# Patient Record
Sex: FEMALE | Race: BLACK OR AFRICAN AMERICAN | Employment: FULL TIME | URBAN - METROPOLITAN AREA
[De-identification: names, ages, dates, MRNs, and addresses within clinical notes are randomized per-mention and may not be internally consistent; named-entity substitution may affect disease eponyms.]

---

## 2016-11-14 LAB
HBSAG, EXTERNAL: NEGATIVE
HIV, EXTERNAL: NON REACTIVE
RUBELLA, EXTERNAL: NORMAL
TYPE, ABO & RH, EXTERNAL: NORMAL

## 2017-03-03 LAB
CHLAMYDIA, EXTERNAL: NEGATIVE
N. GONORRHEA, EXTERNAL: NEGATIVE

## 2017-03-27 LAB
ANTIBODY SCREEN, EXTERNAL: NEGATIVE
T. PALLIDUM, EXTERNAL: NEGATIVE

## 2017-05-09 LAB — GRBS, EXTERNAL: NEGATIVE

## 2017-06-19 ENCOUNTER — HOSPITAL ENCOUNTER (INPATIENT)
Age: 32
LOS: 4 days | Discharge: HOME | End: 2017-06-23
Attending: OBSTETRICS & GYNECOLOGY | Admitting: OBSTETRICS & GYNECOLOGY
Payer: COMMERCIAL

## 2017-06-19 ENCOUNTER — ANESTHESIA EVENT (OUTPATIENT)
Dept: LABOR AND DELIVERY | Age: 32
End: 2017-06-19
Payer: COMMERCIAL

## 2017-06-19 ENCOUNTER — ANESTHESIA (OUTPATIENT)
Dept: LABOR AND DELIVERY | Age: 32
End: 2017-06-19
Payer: COMMERCIAL

## 2017-06-19 PROBLEM — Z34.90 PREGNANCY: Status: ACTIVE | Noted: 2017-06-19

## 2017-06-19 LAB
BASOPHILS # BLD AUTO: 0 K/UL (ref 0–0.1)
BASOPHILS # BLD: 0 % (ref 0–1)
DAILY QC (YES/NO)?: YES
EOSINOPHIL # BLD: 0 K/UL (ref 0–0.4)
EOSINOPHIL NFR BLD: 1 % (ref 0–7)
ERYTHROCYTE [DISTWIDTH] IN BLOOD BY AUTOMATED COUNT: 13.3 % (ref 11.5–14.5)
HCT VFR BLD AUTO: 37.8 % (ref 35–47)
HGB BLD-MCNC: 13.2 G/DL (ref 11.5–16)
LYMPHOCYTES # BLD AUTO: 21 % (ref 12–49)
LYMPHOCYTES # BLD: 1.7 K/UL (ref 0.8–3.5)
MCH RBC QN AUTO: 31.7 PG (ref 26–34)
MCHC RBC AUTO-ENTMCNC: 34.9 G/DL (ref 30–36.5)
MCV RBC AUTO: 90.6 FL (ref 80–99)
MONOCYTES # BLD: 0.5 K/UL (ref 0–1)
MONOCYTES NFR BLD AUTO: 6 % (ref 5–13)
NEUTS SEG # BLD: 5.9 K/UL (ref 1.8–8)
NEUTS SEG NFR BLD AUTO: 72 % (ref 32–75)
PH, VAGINAL FLUID: 6.5 (ref 5–6.1)
PLATELET # BLD AUTO: 170 K/UL (ref 150–400)
RBC # BLD AUTO: 4.17 M/UL (ref 3.8–5.2)
WBC # BLD AUTO: 8.1 K/UL (ref 3.6–11)

## 2017-06-19 PROCEDURE — 74011250636 HC RX REV CODE- 250/636

## 2017-06-19 PROCEDURE — 76010000392 HC C SECN EA ADDL 0.5 HR: Performed by: OBSTETRICS & GYNECOLOGY

## 2017-06-19 PROCEDURE — 76060000078 HC EPIDURAL ANESTHESIA: Performed by: OBSTETRICS & GYNECOLOGY

## 2017-06-19 PROCEDURE — 99282 EMERGENCY DEPT VISIT SF MDM: CPT | Performed by: OBSTETRICS & GYNECOLOGY

## 2017-06-19 PROCEDURE — 74011250636 HC RX REV CODE- 250/636: Performed by: OBSTETRICS & GYNECOLOGY

## 2017-06-19 PROCEDURE — 85025 COMPLETE CBC W/AUTO DIFF WBC: CPT | Performed by: OBSTETRICS & GYNECOLOGY

## 2017-06-19 PROCEDURE — 76010000391 HC C SECN FIRST 1 HR: Performed by: OBSTETRICS & GYNECOLOGY

## 2017-06-19 PROCEDURE — 74011000250 HC RX REV CODE- 250

## 2017-06-19 PROCEDURE — 83986 ASSAY PH BODY FLUID NOS: CPT | Performed by: OBSTETRICS & GYNECOLOGY

## 2017-06-19 PROCEDURE — 65270000029 HC RM PRIVATE

## 2017-06-19 PROCEDURE — 59025 FETAL NON-STRESS TEST: CPT | Performed by: OBSTETRICS & GYNECOLOGY

## 2017-06-19 PROCEDURE — 75410000002 HC LABOR FEE PER 1 HR: Performed by: OBSTETRICS & GYNECOLOGY

## 2017-06-19 PROCEDURE — 77030007866 HC KT SPN ANES BBMI -B: Performed by: ANESTHESIOLOGY

## 2017-06-19 PROCEDURE — 75410000003 HC RECOV DEL/VAG/CSECN EA 0.5 HR: Performed by: OBSTETRICS & GYNECOLOGY

## 2017-06-19 PROCEDURE — 36415 COLL VENOUS BLD VENIPUNCTURE: CPT | Performed by: OBSTETRICS & GYNECOLOGY

## 2017-06-19 PROCEDURE — 75810000275 HC EMERGENCY DEPT VISIT NO LEVEL OF CARE

## 2017-06-19 RX ORDER — OXYTOCIN IN 5 % DEXTROSE 30/500 ML
1-25 PLASTIC BAG, INJECTION (ML) INTRAVENOUS
Status: DISCONTINUED | OUTPATIENT
Start: 2017-06-19 | End: 2017-06-20

## 2017-06-19 RX ORDER — SODIUM CHLORIDE, SODIUM LACTATE, POTASSIUM CHLORIDE, CALCIUM CHLORIDE 600; 310; 30; 20 MG/100ML; MG/100ML; MG/100ML; MG/100ML
125 INJECTION, SOLUTION INTRAVENOUS CONTINUOUS
Status: DISCONTINUED | OUTPATIENT
Start: 2017-06-19 | End: 2017-06-19

## 2017-06-19 RX ORDER — SODIUM CHLORIDE, SODIUM LACTATE, POTASSIUM CHLORIDE, CALCIUM CHLORIDE 600; 310; 30; 20 MG/100ML; MG/100ML; MG/100ML; MG/100ML
INJECTION, SOLUTION INTRAVENOUS
Status: DISCONTINUED | OUTPATIENT
Start: 2017-06-19 | End: 2017-06-20 | Stop reason: HOSPADM

## 2017-06-19 RX ORDER — NALOXONE HYDROCHLORIDE 0.4 MG/ML
0.4 INJECTION, SOLUTION INTRAMUSCULAR; INTRAVENOUS; SUBCUTANEOUS AS NEEDED
Status: DISCONTINUED | OUTPATIENT
Start: 2017-06-19 | End: 2017-06-20 | Stop reason: HOSPADM

## 2017-06-19 RX ORDER — BUPIVACAINE HYDROCHLORIDE 7.5 MG/ML
INJECTION, SOLUTION EPIDURAL; RETROBULBAR AS NEEDED
Status: DISCONTINUED | OUTPATIENT
Start: 2017-06-19 | End: 2017-06-20 | Stop reason: HOSPADM

## 2017-06-19 RX ORDER — KETOROLAC TROMETHAMINE 30 MG/ML
30 INJECTION, SOLUTION INTRAMUSCULAR; INTRAVENOUS
Status: CANCELLED | OUTPATIENT
Start: 2017-06-19 | End: 2017-06-20

## 2017-06-19 RX ORDER — SODIUM CHLORIDE, SODIUM LACTATE, POTASSIUM CHLORIDE, CALCIUM CHLORIDE 600; 310; 30; 20 MG/100ML; MG/100ML; MG/100ML; MG/100ML
125 INJECTION, SOLUTION INTRAVENOUS CONTINUOUS
Status: DISCONTINUED | OUTPATIENT
Start: 2017-06-19 | End: 2017-06-20

## 2017-06-19 RX ORDER — CEFAZOLIN SODIUM IN 0.9 % NACL 2 G/50 ML
2 INTRAVENOUS SOLUTION, PIGGYBACK (ML) INTRAVENOUS ONCE
Status: DISCONTINUED | OUTPATIENT
Start: 2017-06-19 | End: 2017-06-20

## 2017-06-19 RX ORDER — SODIUM CHLORIDE 0.9 % (FLUSH) 0.9 %
5-10 SYRINGE (ML) INJECTION EVERY 8 HOURS
Status: DISCONTINUED | OUTPATIENT
Start: 2017-06-19 | End: 2017-06-20

## 2017-06-19 RX ORDER — MORPHINE SULFATE 0.5 MG/ML
INJECTION, SOLUTION EPIDURAL; INTRATHECAL; INTRAVENOUS AS NEEDED
Status: DISCONTINUED | OUTPATIENT
Start: 2017-06-19 | End: 2017-06-20 | Stop reason: HOSPADM

## 2017-06-19 RX ORDER — DIPHENHYDRAMINE HYDROCHLORIDE 50 MG/ML
25 INJECTION, SOLUTION INTRAMUSCULAR; INTRAVENOUS
Status: CANCELLED | OUTPATIENT
Start: 2017-06-19 | End: 2017-06-20

## 2017-06-19 RX ORDER — SODIUM CHLORIDE 0.9 % (FLUSH) 0.9 %
5-10 SYRINGE (ML) INJECTION AS NEEDED
Status: DISCONTINUED | OUTPATIENT
Start: 2017-06-19 | End: 2017-06-20

## 2017-06-19 RX ADMIN — BUPIVACAINE HYDROCHLORIDE 1.6 ML: 7.5 INJECTION, SOLUTION EPIDURAL; RETROBULBAR at 23:38

## 2017-06-19 RX ADMIN — SODIUM CHLORIDE, SODIUM LACTATE, POTASSIUM CHLORIDE, AND CALCIUM CHLORIDE 125 ML/HR: 600; 310; 30; 20 INJECTION, SOLUTION INTRAVENOUS at 20:50

## 2017-06-19 RX ADMIN — SODIUM CHLORIDE, SODIUM LACTATE, POTASSIUM CHLORIDE, CALCIUM CHLORIDE: 600; 310; 30; 20 INJECTION, SOLUTION INTRAVENOUS at 23:38

## 2017-06-19 RX ADMIN — MORPHINE SULFATE 300 MCG: 0.5 INJECTION, SOLUTION EPIDURAL; INTRATHECAL; INTRAVENOUS at 23:38

## 2017-06-19 RX ADMIN — SODIUM CHLORIDE, SODIUM LACTATE, POTASSIUM CHLORIDE, AND CALCIUM CHLORIDE 500 ML: 600; 310; 30; 20 INJECTION, SOLUTION INTRAVENOUS at 03:41

## 2017-06-19 RX ADMIN — Medication 2 MILLI-UNITS/MIN: at 20:49

## 2017-06-19 NOTE — PROGRESS NOTES
Introduced myself to pt. Previously discussed plans of care with Dr. Boston Dakins, and offered to answer any questions. Currently standing for contractions,  supporting. FHR with moderate variability and some variable decels.     Will continue monitoring

## 2017-06-19 NOTE — ED TRIAGE NOTES
Labor and delivery staff aware of patient arrival; states they are expecting patient. Assisting to labor and delivery unit via wheelchair by RN.

## 2017-06-19 NOTE — PROGRESS NOTES
06/19/17 12:26 PM  CM met with patient to complete initial assessment and to begin discharge planning. Demographics were reviewed and confirmed. Patient works as a  and will have 12 weeks off from work. Patient's /FOB Javi Shah [phone]) works and will have 2 weeks off from work. This is the couple's first child. There is a large support system of family and friends to provide assistance as needed. Patient plans to breastfeed and has a pump to use. Pediatric Center ([facility] location) will provide medical follow up for the baby. Patient has car seat, crib, clothing, and other necessary supplies. Denied need for [address] [place] [address] and Medicaid services. Care Management Interventions  PCP Verified by CM:  Yes  Transition of Care Consult (CM Consult): Discharge Planning  Current Support Network: Lives with Spouse  Confirm Follow Up Transport: Family  Plan discussed with Pt/Family/Caregiver: Yes  Discharge Location  Discharge Placement: [address] [place] [address]

## 2017-06-19 NOTE — PROGRESS NOTES
Patient seen   Sitting up in bed, calm environment, breathing through contractions  Continued loss of scant clear fluid, no bleeding  Visit Vitals    /81    Pulse 72    Temp 98.5 °F (36.9 °C)    Resp 18    Ht 5' 4.5\" (1.638 m)    Wt 75.8 kg (167 lb)    Breastfeeding No    BMI 28.22 kg/m2      Patient Vitals for the past 4 hrs: Mode Fetal Heart Rate Fetal Activity Variability Decelerations Accelerations RN Reviewed Strip?  FHR Interventions   06/19/17 0630 External 135 - 6-25 BPM Variable Yes Yes -   06/19/17 0600 External 135 Present 6-25 BPM Early Yes Yes -   06/19/17 0530 External 135 Present 6-25 BPM None Yes Yes -   06/19/17 0500 External 135 Present 6-25 BPM Early No Yes -   06/19/17 0430 External 135 Present 6-25 BPM Variable Yes Yes -   06/19/17 0411 - - - - - - - Other (comment)   06/19/17 0400 External 145 Present 6-25 BPM Variable No Yes -   06/19/17 0343 - - - - - - - IV Fluid Bolus   06/19/17 0330 External 140 Present 6-25 BPM Variable No Yes -    Cervical Exam  Dilation (cm): 4 (Dr Hatch Cord)  Eff: 100 %  Station: -1  Membrane Status: SROM   discussed augmentation which patient will consider at some point today but would like to continue unassisted at this time  Reassuring fetal surveillance and no sign of infection or concerning clinical findings

## 2017-06-19 NOTE — PROGRESS NOTES
1900 Assumed care of pt at this time from [address] [place], [place] [address]MICHELrena Denisa, Millicent Kiser 124 Discussing use of pitocin with pt (after being presented as an option to pt and  by Dr. Daya Peck via phone call with the pt and MD). Questions answered. Pt and  will discuss further. MD to call back later.  Spoke to Dr. Daya Peck, verbal order received to start pitocin at this time. RN confirmed with MD that he is aware of occasional variable decels, MD states he is aware.  Spoke to Dr. Thanh Avelar, updated on pt's current status, pitocin, FHT, variables, POC. MD states she is aware and reviewed fetal tracing. David Fraserrt Dr. Thanh Avelar at bedside, SVE /-1. MD discussed lack of progress with pt, her assessment of pelvic exam, giving pt until midnight until reassessment. 18 Pt and  state they would like to proceed with  at this time rather than waiting until midnight. MD aware. 2340 FHR in 's after returning to baseline from decel when EFM initially applied.

## 2017-06-19 NOTE — PROGRESS NOTES
0700 Received bedside verbal report from Basim Andre RN. Patient is laboring with  at the bedside. Patient complains of pain during contractions 7/10. No pain relief requested at this time. Patient denies headaches, N/V, visual disturbances, SOB, chest pain vaginal discharge or bleeding. RN discussed birthing plan with patient and , all agree. Will continue to monitor. 9074 Dr Romana Standing at the bedside assessing patient. SVE 4/100/-1. No new orders at this time will continue to monitor. 1045 Discussed using Nitrous with patient as a pain control method. Patient elected to start using nitrous. Rn gathered machine and reviewed agreement and consent with patient and . Patient given the opportunity for questions. None at this time. Will continue to monitor. 1200 Patient wanted to ambulate in the halls. RN place patient on remote monitoring. Will continue to monitor. 200 Dr. Marielos Cr at the bedside assessing patient. SVE 6/100/-1. No new orders. Will continue to monitor. 1600 Patient complained of increased pressure and requested to be checked by RN, SVE 6/100/0. Will continue to monitor. 1655 SVE by Nayan Anaya. 6/100/0. No change from previous check. MD notified. No further orders at this time. MD explained that he will contact patient and discuss options. Bedside and Verbal shift change report given to Jerry Steven RN (oncoming nurse) by Sebastián South RN (offgoing nurse). Report included the following information SBAR, Kardex and MAR.

## 2017-06-19 NOTE — PROGRESS NOTES
0059:  at 41.1 wks. Arrives with c/o contractions every 5-6 min and leaking fluid since 0017. Pt. States fluid is clear without odor. Pt. Desires unmedicated, low-intervention labor and birth. Pt. Presents birth plan. RN reviewed birth plan with patient. 0117: Pt. Repositioned to right lateral.  0120: Pt. Advised that since she will not be receiving continuous IV fluids at this time she needs to be orally hydrating frequently during labor. Pt. Provided 1 liter of water to drink. Pt. Verbalized understanding. 0139: EFM removed so patient can ambulate in halls. Pt. Given underware and pads to wear while ambulating. Pt. Instructed to remain on unit while ambulating and to ambulate X 1 hour and return to room after for fetal monitoring. Pt. Verbalized understanding. 12: Discussed with pt. That if variable decels continue will need to start IV fluids. At this time will attempt to reposition side to side to improve variables. Pt. Verbalized understanding. Pt. Continues to PO hydrate with water. 0: Discussed with pt. Need to bolus with IV fluids due to continued variable decels and FHR tracing. Pt. Agrees to IV fluid bolus. 0430: Pt. States feeling slight pressure. Offered to recheck cervix but pt. Declines vaginal exam at this time. Pt. Will notify RN when she is ready to be checked. 0450: Pt. oob to bathroom  0502: Pt. Given cranberry juice to drink. : Pt. Standing at side of bed with  providing support.   Torres Christopher: SBAR report given to MICHEL Perez RN

## 2017-06-19 NOTE — H&P
History & Physical    Name: Timmy Saldana MRN: [id number]  SSN: [id number]    YOB: 1985  Age: 33 y.o. Sex: female        Subjective:     Estimated Date of Delivery: 17  OB History    Para Term  AB SAB TAB Ectopic Multiple Living   1         0      # Outcome Date GA Lbr Jai/2nd Weight Sex Delivery Anes PTL Lv   1 Current                   Ms. Anabelle Grant is admitted with pregnancy at 41w1d for labor check. Prenatal course was normal. Please see prenatal records for details. Past Medical History:   Diagnosis Date    Abnormal Papanicolaou smear of cervix     Other unknown and unspecified cause of morbidity or mortality     Uterine fibroids     No past surgical history on file. Social History     Occupational History    Not on file. Social History Main Topics    Smoking status: Never Smoker    Smokeless tobacco: Not on file    Alcohol use No    Drug use: No    Sexual activity: Yes     Partners: Male     No family history on file. No Known Allergies  Prior to Admission medications    Medication Sig Start Date End Date Taking? Authorizing Provider   prenatal vit-iron fumarate-fa (RIGHT STEP PRENATAL VITAMINS) 27 mg iron- 0.8 mg tab tablet Take 1 Tab by mouth daily. Yes Historical Provider        Review of Systems: Constitutional: negative  Respiratory: negative  Cardiovascular: negative  Gastrointestinal: negative  Genitourinary:negative  Integument/breast: negative  Musculoskeletal:negative  Neurological: negative  Behavioral/Psych: negative  Endocrine: negative    Objective:     Vitals:  Vitals:    17 0104   Weight: 75.8 kg (167 lb)   Height: 5' 4.5\" (1.638 m)        Physical Exam:  Patient without distress.   Heart: Regular rate and rhythm  Lung: clear to auscultation throughout lung fields and normal respiratory effort  Abdomen: soft, nontender  Fundus: soft and non tender  Perineum: blood absent, amniotic fluid present/100/-2  Cervical Exam: 4  Membranes: Intact  Fetal Heart Rate: reassuring tracing ctx q 3 minutes    Prenatal Labs:   No results found for: ABORH, RUBELLAEXT, GRBSEXT, HBSAGEXT, HIVEXT, RPREXT, GONNOEXT, CHLAMEXT, ABORHEXT, RUBELLAEXT, GRBSEXT, HBSAGEXT, HIVEXT, RPREXT, GONNOEXT, CHLAMEXT      Assessment/Plan:     Active Problems:    * No active hospital problems.  *       Plan:Admit for labor wants to go natural can have epidural of desires    Signed By:  Beka Adams MD     June 19, 2017

## 2017-06-19 NOTE — PROGRESS NOTES
Doing well, laboring naturally. Visit Vitals    /74    Pulse 78    Temp 98.7 °F (37.1 °C)    Resp 18    Ht 5' 4.5\" (1.638 m)    Wt 75.8 kg (167 lb)    SpO2 99%    Breastfeeding No    BMI 28.22 kg/m2      Patient Vitals for the past 4 hrs: Mode Fetal Heart Rate Fetal Activity Variability Decelerations Accelerations RN Reviewed Strip?   06/19/17 1128 External 135 Present 6-25 BPM Variable Yes Yes   06/19/17 1100 External 135 Present 6-25 BPM Variable Yes Yes   06/19/17 1030 External 135 Present 6-25 BPM Variable Yes Yes   06/19/17 1000 External 135 Present 6-25 BPM Variable Yes Yes   06/19/17 0930 External 135 Present 6-25 BPM Variable Yes Yes   06/19/17 0900 External 135 Present 6-25 BPM None Yes Yes      Some variable decels in setting of mod agusto and accels. Cx C/6/-2. Continue natural labor   Cat 2 tracing, without evidence of distress.

## 2017-06-20 PROCEDURE — 65270000029 HC RM PRIVATE

## 2017-06-20 PROCEDURE — 74011250636 HC RX REV CODE- 250/636: Performed by: ANESTHESIOLOGY

## 2017-06-20 PROCEDURE — 77010026065 HC OXYGEN MINIMUM MEDICAL AIR: Performed by: OBSTETRICS & GYNECOLOGY

## 2017-06-20 PROCEDURE — 75410000002 HC LABOR FEE PER 1 HR: Performed by: OBSTETRICS & GYNECOLOGY

## 2017-06-20 PROCEDURE — 74011250637 HC RX REV CODE- 250/637: Performed by: OBSTETRICS & GYNECOLOGY

## 2017-06-20 RX ORDER — SODIUM CHLORIDE 0.9 % (FLUSH) 0.9 %
5-10 SYRINGE (ML) INJECTION AS NEEDED
Status: DISCONTINUED | OUTPATIENT
Start: 2017-06-20 | End: 2017-06-23 | Stop reason: HOSPADM

## 2017-06-20 RX ORDER — SWAB
1 SWAB, NON-MEDICATED MISCELLANEOUS DAILY
Status: DISCONTINUED | OUTPATIENT
Start: 2017-06-20 | End: 2017-06-23 | Stop reason: HOSPADM

## 2017-06-20 RX ORDER — OXYTOCIN 10 [USP'U]/ML
INJECTION, SOLUTION INTRAMUSCULAR; INTRAVENOUS AS NEEDED
Status: DISCONTINUED | OUTPATIENT
Start: 2017-06-20 | End: 2017-06-20 | Stop reason: HOSPADM

## 2017-06-20 RX ORDER — NALOXONE HYDROCHLORIDE 0.4 MG/ML
0.4 INJECTION, SOLUTION INTRAMUSCULAR; INTRAVENOUS; SUBCUTANEOUS AS NEEDED
Status: DISCONTINUED | OUTPATIENT
Start: 2017-06-20 | End: 2017-06-23 | Stop reason: HOSPADM

## 2017-06-20 RX ORDER — ZOLPIDEM TARTRATE 5 MG/1
5 TABLET ORAL
Status: DISCONTINUED | OUTPATIENT
Start: 2017-06-20 | End: 2017-06-23 | Stop reason: HOSPADM

## 2017-06-20 RX ORDER — SODIUM CHLORIDE, SODIUM LACTATE, POTASSIUM CHLORIDE, CALCIUM CHLORIDE 600; 310; 30; 20 MG/100ML; MG/100ML; MG/100ML; MG/100ML
125 INJECTION, SOLUTION INTRAVENOUS CONTINUOUS
Status: DISCONTINUED | OUTPATIENT
Start: 2017-06-20 | End: 2017-06-23 | Stop reason: HOSPADM

## 2017-06-20 RX ORDER — OXYTOCIN/RINGER'S LACTATE 20/1000 ML
125-500 PLASTIC BAG, INJECTION (ML) INTRAVENOUS ONCE
Status: ACTIVE | OUTPATIENT
Start: 2017-06-20 | End: 2017-06-20

## 2017-06-20 RX ORDER — HYDROCODONE BITARTRATE AND ACETAMINOPHEN 5; 325 MG/1; MG/1
1 TABLET ORAL
Status: DISCONTINUED | OUTPATIENT
Start: 2017-06-20 | End: 2017-06-23 | Stop reason: HOSPADM

## 2017-06-20 RX ORDER — SIMETHICONE 80 MG
80 TABLET,CHEWABLE ORAL AS NEEDED
Status: DISCONTINUED | OUTPATIENT
Start: 2017-06-20 | End: 2017-06-23 | Stop reason: HOSPADM

## 2017-06-20 RX ORDER — KETOROLAC TROMETHAMINE 30 MG/ML
30 INJECTION, SOLUTION INTRAMUSCULAR; INTRAVENOUS
Status: DISPENSED | OUTPATIENT
Start: 2017-06-20 | End: 2017-06-21

## 2017-06-20 RX ORDER — SODIUM CHLORIDE 0.9 % (FLUSH) 0.9 %
5-10 SYRINGE (ML) INJECTION EVERY 8 HOURS
Status: DISCONTINUED | OUTPATIENT
Start: 2017-06-20 | End: 2017-06-21

## 2017-06-20 RX ORDER — IBUPROFEN 800 MG/1
800 TABLET ORAL EVERY 8 HOURS
Status: DISCONTINUED | OUTPATIENT
Start: 2017-06-20 | End: 2017-06-23 | Stop reason: HOSPADM

## 2017-06-20 RX ORDER — DIPHENHYDRAMINE HYDROCHLORIDE 50 MG/ML
25 INJECTION, SOLUTION INTRAMUSCULAR; INTRAVENOUS
Status: ACTIVE | OUTPATIENT
Start: 2017-06-20 | End: 2017-06-21

## 2017-06-20 RX ADMIN — KETOROLAC TROMETHAMINE 30 MG: 30 INJECTION, SOLUTION INTRAMUSCULAR at 02:42

## 2017-06-20 RX ADMIN — OXYTOCIN 10 UNITS: 10 INJECTION, SOLUTION INTRAMUSCULAR; INTRAVENOUS at 00:09

## 2017-06-20 RX ADMIN — SODIUM CHLORIDE, SODIUM LACTATE, POTASSIUM CHLORIDE, CALCIUM CHLORIDE: 600; 310; 30; 20 INJECTION, SOLUTION INTRAVENOUS at 00:08

## 2017-06-20 RX ADMIN — OXYTOCIN 30 UNITS: 10 INJECTION, SOLUTION INTRAMUSCULAR; INTRAVENOUS at 00:02

## 2017-06-20 RX ADMIN — IBUPROFEN 800 MG: 800 TABLET, FILM COATED ORAL at 21:58

## 2017-06-20 RX ADMIN — IBUPROFEN 800 MG: 800 TABLET, FILM COATED ORAL at 13:56

## 2017-06-20 NOTE — LACTATION NOTE
Problem: Lactation Care Plan  Goal: *Infant latching appropriately  Outcome: Progressing Towards Goal  Mom states baby nursed well several times after delivery. Nursed about 30 minutes ago, for 5 minutes.                   Goal: *Weight loss less than 10% of birth weight  Outcome: Progressing Towards Goal  Encouraged mom to attempt feeding every 2-3 hours in the first couple of days. Looking for 8-12 feedings in 24 hours. Don't limit baby at breast, allow baby to come of breast on it's own. Baby may want to feed more often then every 2-3 hours. Baby may not feed that often, but feedings should be attempted. If baby doesn't nurse, Mom can hand express drops of colostrum and drip into baby's mouth, or give to baby by finger feeding, cup feeding,or spoon feeding. Encouraged skin to skin.      Mom agrees with plan     Problem: Patient Education: Go to Patient Education Activity  Goal: Patient/Family Education  Outcome: Progressing Towards Goal  Reviewed breastfeeding basics: Supply and demand,  stomach size, early Feeding cues, skin to skin, positioning and baby led latch-on, assymetrical latch with signs of good, deep latch vs shallow, feeding frequency and duration, and log sheet for tracking infant feedings and output. Breastfeeding Booklet and Warm line information given.  Discussed typical  weight loss and the importance of infant weight checks with pediatrician 1-2 post discharge.

## 2017-06-20 NOTE — PROGRESS NOTES
Labor Progress Note  Patient seen, fetal heart rate and contraction pattern evaluated, patient examined. Physical Exam:  Cervical Exam: 6 cm dilated , 80%   Membranes:  ROM  Uterine Activity: Frequency: Every 2 minutes  Fetal Heart Rate: Category 2 fetal tracing  Station -1    Assessment/Plan:  Arrest of dilation despite adequate contractions; recommend primary  section. Technicalities,risks,benefits reviewed with patient. OB team notified; Anesthesia team notified.    Discussed with pt and family, agree

## 2017-06-20 NOTE — ANESTHESIA PREPROCEDURE EVALUATION
Anesthetic History   No history of anesthetic complications            Review of Systems / Medical History  Patient summary reviewed, nursing notes reviewed and pertinent labs reviewed    Pulmonary  Within defined limits                 Neuro/Psych   Within defined limits           Cardiovascular  Within defined limits                Exercise tolerance: >4 METS     GI/Hepatic/Renal  Within defined limits              Endo/Other  Within defined limits           Other Findings              Physical Exam    Airway  Mallampati: II  TM Distance: 4 - 6 cm  Neck ROM: normal range of motion   Mouth opening: Normal     Cardiovascular               Dental    Dentition: Lower dentition intact and Upper dentition intact     Pulmonary                 Abdominal         Other Findings            Anesthetic Plan    ASA: 2  Anesthesia type: spinal            Anesthetic plan and risks discussed with: Patient

## 2017-06-20 NOTE — OP NOTES
OPERATIVE REPORT         Name: Ravi Patel Record Number: [id number]    YOB: 1985   Today's Date: 2017      PREOPERATIVE DIAGNOSES   41.2weeks gestation, Arrest of dilation     POSTOPERATIVE DIAGNOSES   41.2weeks gestation, Arrest of dilation, Cephalo Pelvic Disproportion    PROCEDURE PERFORMED:   Primary low transverse  section via Pfannenstiel. SURGEON: Harriet Monterroso MD     ASSISTANT: QUANG     ANESTHESIA: Spinal     COMPLICATIONS: None. ESTIMATED BLOOD LOSS: 700 ml     INTRAVENOUS FLUIDS: 1300 ml     URINE OUTPUT: Approximately 100 mL clear urine at the end of the   procedure. INDICATIONS: Arrested @ 6 cm dilation, 80% effaced, -1 fetal station despite adequate contrations; Pelvis not adequate for delivery. FINDINGS:Live Female infant, 5 Ibs 14 ozs, 2675 gms, apgar 8/9; normal uterus, with small fibroids posteriorly x 2; normal tubes, normal ovaries. SPECIMENS REMOVED: None    DESCRIPTION OF PROCEDURE:   The patient was taken to the operating room, where spinal anesthesia was found to be adequate. She was then prepared and draped in the normal sterile fashion in the dorsal supine position, with a left-walled tilt. A Pfannenstiel skin incision was then made with a scalpel, and carried through to the underlying layer of fascia with the   Bovie. The fascia was incised in the midline, and the incision extended   laterally with the Casiano scissors. The superior aspect of the fascial   incision was then grasped with the Kocher clamps, elevated, and the   underlying rectus muscles dissected off bluntly. Attention was then turned   to the inferior aspect of this incision, which in a similar fashion was   grasped, tented up with the Kocher clamps, and the rectus muscles dissected   off bluntly. The rectus muscles were then  in the midline, and the   peritoneum identified, tented up, and entered sharply with the Metzenbaum   scissors.  The peritoneal incision was then extended superiorly and   inferiorly, with good visualization of the bladder. A bladder blade was   then inserted, and the vesicouterine peritoneum identified, grasped with   the pickups, and entered sharply with the Metzenbaum scissors. This   incision was then extended laterally, and the bladder flap was created   digitally. The bladder blade was then reinserted, and the lower uterine segment   incised in a transverse fashion with a scalpel. The uterine incision was   then extended laterally. The bladder blade was then removed, and the   infant's head delivered atraumatically. Meconium was noted upon entrance. The nose and mouth were suctioned, the delayed cord clamping for 1 min; then  cord was clamped and cut, and the   infant was handed off to the awaiting Pediatrician. Cord blood and blood gas not obtained. The placenta was then removed manually, the uterus was exteriorized and   cleared of all clots and debris. The uterine incision was repaired with 0   Monocryl in a running, locked fashion. A second layer of the same suture   was used to obtain excellent hemostasis. The bladder flap was then repaired   with 3-0 Vicryl in a running stitch, and the posterior cul-de-sac was then   suctioned for all fluid. The uterus was now returned intra-abdominally. The   gutters were cleared of all clots, and the peritoneum was closed with 3-0   Vicryl. The fascia was reapproximated with 0 Vicryl in a running fashion. The subcutaneous tissue layer was then irrigated and dried. The skin was   now closed in a subcuticular fashion using a 4-0 Monocryl needle. Steri-Strips were   applied. The patient tolerated the procedure well. Sponge, lap and needle counts   were correct x2. Ancef 2 g were given prior to making of the incision. The   patient was taken to the recovery room in stable condition.        Dominique Snowden MD

## 2017-06-20 NOTE — ANESTHESIA PROCEDURE NOTES
Spinal Block    Start time: 6/19/2017 11:41 PM  End time: 6/19/2017 11:41 PM  Performed by: Claudell Boozer  Authorized by: Claudell Boozer     Pre-procedure:   Indications: at surgeon's request and primary anesthetic  Preanesthetic Checklist: patient identified, risks and benefits discussed, anesthesia consent and timeout performed      Spinal Block:   Patient Position:  Seated  Prep Region:  Lumbar  Prep: chlorhexidine      Location:  L3-4  Technique:  Single shot        Needle:   Needle Type:  Pencan  Needle Gauge:  25 G  Attempts:  1      Events: CSF confirmed, no blood with aspiration and no paresthesia        Assessment:  Insertion:  Uncomplicated  Patient tolerance:  Patient tolerated the procedure well with no immediate complications

## 2017-06-20 NOTE — PROGRESS NOTES
Bedside shift change report given to VIRIDIANA Valenzuela RN (oncoming nurse) by FAISAL Hennessy RN (offgoing nurse). Report included the following information SBAR, Intake/Output and MAR.

## 2017-06-20 NOTE — ANESTHESIA POSTPROCEDURE EVALUATION
Post-Anesthesia Evaluation and Assessment    Patient: Angelique Christy MRN: [id number]  SSN: [id number]    YOB: 1985  Age: 33 y.o. Sex: female       Cardiovascular Function/Vital Signs  Visit Vitals    /63    Pulse 65    Temp 36.9 °C (98.5 °F)    Resp 18    Ht 5' 4.5\" (1.638 m)    Wt 75.8 kg (167 lb)    SpO2 98%    Breastfeeding No    BMI 28.22 kg/m2       Patient is status post No value filed. anesthesia for Procedure(s):   SECTION. Nausea/Vomiting: None    Postoperative hydration reviewed and adequate. Pain:  Pain Scale 1: Labor Algorithm/Pain Intensity (17 2151)  Pain Intensity 1: 8 (17 1905)   Managed    Neurological Status:   Neuro (WDL): Within Defined Limits (17 0715)   At baseline    Mental Status and Level of Consciousness: Arousable    Pulmonary Status:   O2 Device: Room air (17 0051)   Adequate oxygenation and airway patent    Complications related to anesthesia: None    Post-anesthesia assessment completed.  No concerns    Signed By: Sailaja Veliz MD     2017

## 2017-06-20 NOTE — PROGRESS NOTES
Bedside and Verbal shift change report given to [employer] (oncoming nurse) by Louise Gorman. Liudmila Calderon (offgoing nurse). Report given with SBAR, Kardex, Intake/Output and MAR.

## 2017-06-20 NOTE — PROGRESS NOTES
Post-Operative  Day 1    Audelia Conner     Assessment: Post-Op day 1, stable    Plan:   1. Routine post-operative care    Information for the patient's :  Adolfo Egan, Female [[id number]]   , Low Transverse   Patient doing well without significant complaint. Nausea and vomiting resolved, tolerating liquids, no flatus, christopher in place. Vitals:  Visit Vitals    /64 (BP 1 Location: Left arm, BP Patient Position: At rest)    Pulse (!) 55    Temp 98.3 °F (36.8 °C)    Resp 18    Ht 5' 4.5\" (1.638 m)    Wt 75.8 kg (167 lb)    SpO2 97%    Breastfeeding No    BMI 28.22 kg/m2     Temp (24hrs), Av.6 °F (37 °C), Min:98.3 °F (36.8 °C), Max:99 °F (37.2 °C)      Last 24hr Input/Output:    Intake/Output Summary (Last 24 hours) at 17 0742  Last data filed at 17 0326   Gross per 24 hour   Intake             2200 ml   Output             1000 ml   Net             1200 ml          Exam:        Patient without distress. Lungs clear. Abdomen, bowel sounds present, soft, expected tenderness, fundus firm Wound dressing intact     Perineum normal lochia noted               Lower extremities are negative for swelling, cords or tenderness. Labs:   Lab Results   Component Value Date/Time    WBC 8.1 2017 01:28 AM    HGB 13.2 2017 01:28 AM    HCT 37.8 2017 01:28 AM    PLATELET 481  01:28 AM       No results found for this or any previous visit (from the past 24 hour(s)).

## 2017-06-20 NOTE — ROUTINE PROCESS
0350 TRANSFER - OUT REPORT:    Verbal report given to Aliyah Rasmussen RN(name) on Giovani Willard  being transferred to MIU(unit) for routine progression of care       Report consisted of patients Situation, Background, Assessment and   Recommendations(SBAR). Information from the following report(s) SBAR, Kardex, Intake/Output, MAR and Recent Results was reviewed with the receiving nurse. Lines:   Peripheral IV 06/19/17 Right Forearm (Active)   Site Assessment Clean, dry, & intact 6/19/2017  7:10 PM   Phlebitis Assessment 0 6/19/2017  7:10 PM   Infiltration Assessment 0 6/19/2017  7:10 PM   Dressing Status Clean, dry, & intact 6/19/2017  7:10 PM   Dressing Type Tape;Transparent 6/19/2017  7:10 PM   Hub Color/Line Status Pink 6/19/2017  7:10 PM   Alcohol Cap Used Yes 6/19/2017  7:10 PM        Opportunity for questions and clarification was provided.       Patient transported with:   Registered Nurse

## 2017-06-20 NOTE — L&D DELIVERY NOTE
Delivery Summary    Patient: Angelique Christy MRN: [id number]  SSN: [id number]    YOB: 1985  Age: 33 y.o. Sex: female        Labor Events:    Labor: No    Rupture Date: 2017    Rupture Time: 12:17 AM    Rupture Type: SROM    Amniotic Fluid Volume:       Amniotic Fluid Description:  Amniotic fluid Odor: Clear    None     Induction: None         Induction Date:        Induction Time:       Indications for Induction:       Augmentation: Oxytocin    Augmentation Date:      Augmentation Time:      Indications for Augmentation: Ineffective Contraction Pattern    Events:        Cervical Ripening:       None    Rupture Identifier: Rupture 1     Labor complications: Additional complications:         Delivery Events:  Estimated Blood Loss (ml):        Information for the patient's Elizabeth Cosby, Female [[id number]]     Delivery Summary - Baby    Delivery Date: 2017  Delivery Time: 12:00 AM  Delivery Type: , Low Transverse    Section Delivery:     Sex:  female     Gestational Age: 38w3d  Delivery Clinician:  Giovany Franklin?: Yes  Delivery Location: OR           APGARS  One minute Five minutes Ten minutes   Skin color: 1   1        Heart rate: 2   2        Grimace: 2   2        Muscle tone: 1   2        Breathin   2        Totals: 8   9           Presentation: Vertex    Position:        Resuscitation Method:  Suctioning-deep; Suctioning-bulb; Tactile Stimulation     Meconium Stained: Other (Comment) terminal    Cord Vessels: 3 Vessels      Cord Events:    Cord Blood Sent?:  No    Blood Gases Sent?:  No    Placenta:  Date/Time:  12:02 AM  Removal: Manual Removal      Appearance: Normal      Measurements:  Birth Weight: 2.675 kg      Birth Length: 48.3 cm      Head Circumference: 34.5 cm      Chest Circumference: 31 cm     Abdominal Girth: 29.5 cm    Other Providers:           Group Beta Strep:   Lab Results   Component Value Date/Time Kade, External Negative 2017        Cord Blood Results:  Information for the patient's :  Jose Antonio Jamil, Female [[id number]]   No results found for: Loralie Mano, PCTDIG, BILI, ABORHEXT, ABORH    Information for the patient's newbornIsreal Myrna, Female [[id number]]   No results found for: APH, APCO2, APO2, AHCO3, ABEC, ABDC, O2ST, SITE, [place], PHI, PCO2I, PO2I, HCO3I, SO2I, IBD    Information for the patient's newbornIsreal Myrna, Female [[id number]]   No results found for: EPHV, PCO2V, PO2V, HCO3V, O2STV, EBDV

## 2017-06-21 LAB
BASOPHILS # BLD AUTO: 0 K/UL (ref 0–0.1)
BASOPHILS # BLD: 0 % (ref 0–1)
EOSINOPHIL # BLD: 0.1 K/UL (ref 0–0.4)
EOSINOPHIL NFR BLD: 1 % (ref 0–7)
ERYTHROCYTE [DISTWIDTH] IN BLOOD BY AUTOMATED COUNT: 13.8 % (ref 11.5–14.5)
HCT VFR BLD AUTO: 32.2 % (ref 35–47)
HGB BLD-MCNC: 11 G/DL (ref 11.5–16)
LYMPHOCYTES # BLD AUTO: 23 % (ref 12–49)
LYMPHOCYTES # BLD: 2 K/UL (ref 0.8–3.5)
MCH RBC QN AUTO: 31.5 PG (ref 26–34)
MCHC RBC AUTO-ENTMCNC: 34.2 G/DL (ref 30–36.5)
MCV RBC AUTO: 92.3 FL (ref 80–99)
MONOCYTES # BLD: 0.8 K/UL (ref 0–1)
MONOCYTES NFR BLD AUTO: 9 % (ref 5–13)
NEUTS SEG # BLD: 6 K/UL (ref 1.8–8)
NEUTS SEG NFR BLD AUTO: 67 % (ref 32–75)
PLATELET # BLD AUTO: 150 K/UL (ref 150–400)
RBC # BLD AUTO: 3.49 M/UL (ref 3.8–5.2)
WBC # BLD AUTO: 8.9 K/UL (ref 3.6–11)

## 2017-06-21 PROCEDURE — 74011250637 HC RX REV CODE- 250/637: Performed by: OBSTETRICS & GYNECOLOGY

## 2017-06-21 PROCEDURE — 65270000029 HC RM PRIVATE

## 2017-06-21 PROCEDURE — 36415 COLL VENOUS BLD VENIPUNCTURE: CPT | Performed by: OBSTETRICS & GYNECOLOGY

## 2017-06-21 PROCEDURE — 85025 COMPLETE CBC W/AUTO DIFF WBC: CPT | Performed by: OBSTETRICS & GYNECOLOGY

## 2017-06-21 RX ORDER — DOCUSATE SODIUM 100 MG/1
100 CAPSULE, LIQUID FILLED ORAL DAILY
Status: DISCONTINUED | OUTPATIENT
Start: 2017-06-22 | End: 2017-06-23 | Stop reason: HOSPADM

## 2017-06-21 RX ORDER — DOCUSATE SODIUM 100 MG/1
100 CAPSULE, LIQUID FILLED ORAL DAILY
Status: DISCONTINUED | OUTPATIENT
Start: 2017-06-22 | End: 2017-06-21

## 2017-06-21 RX ADMIN — IBUPROFEN 800 MG: 800 TABLET, FILM COATED ORAL at 06:03

## 2017-06-21 RX ADMIN — IBUPROFEN 800 MG: 800 TABLET, FILM COATED ORAL at 14:05

## 2017-06-21 RX ADMIN — IBUPROFEN 800 MG: 800 TABLET, FILM COATED ORAL at 21:19

## 2017-06-21 NOTE — LACTATION NOTE
Problem: Lactation Care Plan  Goal: *Infant latching appropriately  Outcome: Progressing Towards Goal  Mom comfortable and relaxed with breast feeding. No voided noted in last 30 hours, so peds order supplementation of up to 10 ccs. Assisted with feed with curved tip syringe with supplementation at breast.      Pt will successfully establish breastfeeding by feeding in response to early feeding cues   or wake every 3h, will obtain deep latch, and will keep log of feedings/output. Taught to BF at hunger cues and or q 2-3 hrs and to offer 10-20 drops of hand expressed colostrum at any non-feeds.        Breast Assessment  Left Breast: Large, Extra large  Left Nipple: Everted, Intact  Right Breast: Large, Extra large  Right Nipple: Everted, Intact  Breast- Feeding Assessment  Attends Breast-Feeding Classes: Yes  Breast-Feeding Experience: No  Breast Trauma/Surgery: No  Type/Quality: Good  Lactation Consultant Visits  Breast-Feedings: Good   Mother/Infant Observation  Mother Observation: Alignment, Breast comfortable, Close hold, Holds breast, Lets baby end feeding, Nipple round on release  Infant Observation: Audible swallows, Breast tissue moves, Latches nipple and aereolae, Opens mouth, Lips flanged, upper, Lips flanged, lower  LATCH Documentation  Latch: Grasps breast, tongue down, lips flanged, rhythmic sucking  Audible Swallowing: Spontaneous and intermittent (24 hours old)  Type of Nipple: Everted (after stimulation)  Comfort (Breast/Nipple): Soft/non-tender  Hold (Positioning): Full assist, teach one side, mother does other, staff holds  LATCH Score: 9          Goal: *Weight loss less than 10% of birth weight  Outcome: Progressing Towards Goal  Encouraged mom to attempt feeding every 2-3 hours in the first couple of days. Looking for 8-12 feedings in 24 hours. Don't limit baby at breast, allow baby to come of breast on it's own. Baby may want to feed more often then every 2-3 hours.   Baby may not feed that often, but feedings should be attempted. If baby doesn't nurse,  Mom can hand express drops of colostrum and drip into baby's mouth, or  give to baby by finger feeding, cup feeding,or spoon feeding. Encouraged skin to skin.      Mom agrees with plan     Problem: Patient Education: Go to Patient Education Activity  Goal: Patient/Family Education  Outcome: Progressing Towards Goal  Reviewed breastfeeding basics:  How milk is made and normal  breastfeeding behaviors discussed. Supply and demand,  stomach size, early feeding cues, skin to skin bonding with comfortable positioning and baby led latch-on reviewed.   How to identify signs of successful breastfeeding sessions reviewed; education on assymetrical latch, signs of effective latching vs shallow, in-effective latching, normal  feeding frequency and duration and expected infant output discussed.

## 2017-06-21 NOTE — PROGRESS NOTES
Bedside and Verbal shift change report given to Rei NAILS (oncoming nurse) by Shakeel Mccray (offgoing nurse). Report included the following information SBAR, Kardex and MAR.

## 2017-06-21 NOTE — PROGRESS NOTES
Bedside and Verbal shift change report given to Allison Delong 6896 (oncoming nurse) by Gale NAILS (offgoing nurse). Report included the following information SBAR, Kardex and MAR.

## 2017-06-21 NOTE — PROGRESS NOTES
Post-Operative  Day 2    Audelia Conner     Assessment: Post-Op day 2, doing well    Plan:   1. Routine post-operative care    Information for the patient's :  Karen Keene, Female [[id number]]   , Low Transverse   Patient doing well without significant complaint. Nausea and vomiting resolved, tolerating liquids, passing flatus, voiding and ambulating without difficulty. Vitals:  Visit Vitals    /70 (BP 1 Location: Right arm, BP Patient Position: At rest)    Pulse (!) 52    Temp 98.2 °F (36.8 °C)    Resp 16    Ht 5' 4.5\" (1.638 m)    Wt 75.8 kg (167 lb)    SpO2 97%    Breastfeeding No    BMI 28.22 kg/m2     Temp (24hrs), Av.3 °F (36.8 °C), Min:98.2 °F (36.8 °C), Max:98.4 °F (36.9 °C)        Exam:        Patient without distress. Abdomen, bowel sounds present, soft, expected tenderness, fundus firm                Wound incision clean, dry and intact               Lower extremities are negative for swelling, cords or tenderness. Labs:   Lab Results   Component Value Date/Time    WBC 8.9 2017 04:29 AM    WBC 8.1 2017 01:28 AM    HGB 11.0 2017 04:29 AM    HGB 13.2 2017 01:28 AM    HCT 32.2 2017 04:29 AM    HCT 37.8 2017 01:28 AM    PLATELET 145  04:29 AM    PLATELET 801  01:28 AM       Recent Results (from the past 24 hour(s))   CBC WITH AUTOMATED DIFF    Collection Time: 17  4:29 AM   Result Value Ref Range    WBC 8.9 3.6 - 11.0 K/uL    RBC 3.49 (L) 3.80 - 5.20 M/uL    HGB 11.0 (L) 11.5 - 16.0 g/dL    HCT 32.2 (L) 35.0 - 47.0 %    MCV 92.3 80.0 - 99.0 FL    MCH 31.5 26.0 - 34.0 PG    MCHC 34.2 30.0 - 36.5 g/dL    RDW 13.8 11.5 - 14.5 %    PLATELET 576 479 - 792 K/uL    NEUTROPHILS 67 32 - 75 %    LYMPHOCYTES 23 12 - 49 %    MONOCYTES 9 5 - 13 %    EOSINOPHILS 1 0 - 7 %    BASOPHILS 0 0 - 1 %    ABS. NEUTROPHILS 6.0 1.8 - 8.0 K/UL    ABS. LYMPHOCYTES 2.0 0.8 - 3.5 K/UL    ABS.  MONOCYTES 0.8 0.0 - 1.0 K/UL    ABS. EOSINOPHILS 0.1 0.0 - 0.4 K/UL    ABS.  BASOPHILS 0.0 0.0 - 0.1 K/UL

## 2017-06-22 PROCEDURE — 74011250637 HC RX REV CODE- 250/637: Performed by: OBSTETRICS & GYNECOLOGY

## 2017-06-22 PROCEDURE — 65270000029 HC RM PRIVATE

## 2017-06-22 RX ORDER — OXYCODONE AND ACETAMINOPHEN 5; 325 MG/1; MG/1
2 TABLET ORAL
Qty: 30 TAB | Refills: 0 | Status: SHIPPED | OUTPATIENT
Start: 2017-06-22

## 2017-06-22 RX ORDER — IBUPROFEN 800 MG/1
800 TABLET ORAL
Qty: 30 TAB | Refills: 1 | Status: SHIPPED | OUTPATIENT
Start: 2017-06-22

## 2017-06-22 RX ADMIN — IBUPROFEN 800 MG: 800 TABLET, FILM COATED ORAL at 21:04

## 2017-06-22 RX ADMIN — SIMETHICONE 80 MG: 80 TABLET, CHEWABLE ORAL at 21:04

## 2017-06-22 RX ADMIN — DOCUSATE SODIUM 100 MG: 100 CAPSULE ORAL at 01:32

## 2017-06-22 RX ADMIN — SIMETHICONE 80 MG: 80 TABLET, CHEWABLE ORAL at 01:32

## 2017-06-22 RX ADMIN — IBUPROFEN 800 MG: 800 TABLET, FILM COATED ORAL at 05:10

## 2017-06-22 RX ADMIN — IBUPROFEN 800 MG: 800 TABLET, FILM COATED ORAL at 12:53

## 2017-06-22 NOTE — PROGRESS NOTES
Post-Operative  Day 3    Audelia Conner       Assessment: Post-Op day 3, doing well    Plan:   1. Discharge home today  2. Follow up in office in 6 weeks with Ashley Kimball MD  3. Post partum activity/wound care advised, diet as tolerated  4. Discharge Medications: ibuprofen, percocet and medications prior to admission      Information for the patient's :  Dayana Iniguez, Female [[id number]]   , Low Transverse   Patient doing well without significant complaint. Tolerating diet, passing flatus, voiding and ambulating without difficulty    Vitals:  Visit Vitals    /76 (BP 1 Location: Right arm, BP Patient Position: At rest)    Pulse (!) 55    Temp 98.6 °F (37 °C)    Resp 16    Ht 5' 4.5\" (1.638 m)    Wt 75.8 kg (167 lb)    SpO2 97%    Breastfeeding No    BMI 28.22 kg/m2     Temp (24hrs), Av.2 °F (36.8 °C), Min:98 °F (36.7 °C), Max:98.6 °F (37 °C)        Exam:        Patient without distress. Abdomen, bowel sounds present, soft, expected tenderness, fundus firm                Wound incision clean, dry and intact               Lower extremities are negative for swelling, cords or tenderness. Labs:   Lab Results   Component Value Date/Time    WBC 8.9 2017 04:29 AM    WBC 8.1 2017 01:28 AM    HGB 11.0 2017 04:29 AM    HGB 13.2 2017 01:28 AM    HCT 32.2 2017 04:29 AM    HCT 37.8 2017 01:28 AM    PLATELET 264 8941 04:29 AM    PLATELET 135  01:28 AM       No results found for this or any previous visit (from the past 24 hour(s)).

## 2017-06-22 NOTE — PROGRESS NOTES
Bedside shift change report given to FAISAL Teague RN (oncoming nurse) by Wilson Mccartney RN (offgoing nurse). Report included the following information SBAR, Kardex, Intake/Output and MAR.

## 2017-06-22 NOTE — DISCHARGE SUMMARY
Obstetrical Discharge Summary     Name: Giovani Willard MRN: [id number]  SSN: [id number]    YOB: 1985  Age: 33 y.o. Sex: female      Admit Date: 2017    Discharge Date: 2017     Admitting Physician: Raleigh Casas MD     Attending Physician:  Evelina Lama MD     Admission Diagnoses: pregnancy  Pregnancy    Discharge Diagnoses:   Information for the patient's :  Edward Rodriguez, Female [[id number]]   Delivery of a 2.675 kg female infant via , Low Transverse on 2017 at 12:00 AM  by . Apgars were 8 and 9. Additional Diagnoses:   Hospital Problems  Never Reviewed          Codes Class Noted POA    Pregnancy ICD-10-CM: Z33.1  ICD-9-CM: V22.2  2017 Unknown             Lab Results   Component Value Date/Time    Rubella, External Immune 2016    GrBStrep, External Negative 2017       Hospital Course: Normal hospital course following the delivery. Disposition at Discharge: Home or self care    Discharged Condition: Stable    Patient Instructions:   Current Discharge Medication List      START taking these medications    Details   oxyCODONE-acetaminophen (PERCOCET) 5-325 mg per tablet Take 2 Tabs by mouth every six (6) hours as needed for Pain. Max Daily Amount: 8 Tabs. Qty: 30 Tab, Refills: 0      ibuprofen (MOTRIN) 800 mg tablet Take 1 Tab by mouth every eight (8) hours as needed for Pain. Qty: 30 Tab, Refills: 1         CONTINUE these medications which have NOT CHANGED    Details   prenatal vit-iron fumarate-fa (RIGHT STEP PRENATAL VITAMINS) 27 mg iron- 0.8 mg tab tablet Take 1 Tab by mouth daily. Reference my discharge instructions.     Follow-up Appointments   Procedures    FOLLOW UP VISIT Appointment in: 6 Weeks     Standing Status:   Standing     Number of Occurrences:   1     Order Specific Question:   Appointment in     Answer:   6 Weeks        Signed By:  Maame Echeverria MD     2017

## 2017-06-22 NOTE — LACTATION NOTE
Problem: Lactation Care Plan  Goal: *Infant latching appropriately  Outcome: Progressing Towards Goal  Mom comfortable and relaxed with breast feeding. Pt will successfully establish breastfeeding by feeding in response to early feeding cues   or wake every 3h, will obtain deep latch, and will keep log of feedings/output. Taught to BF at hunger cues and or q 2-3 hrs and to offer 10-20 drops of hand expressed colostrum at any non-feeds.        Breast Assessment  Left Breast: Large, Extra large  Left Nipple: Everted, Intact  Right Breast: Large, Extra large  Right Nipple: Everted, Intact  Breast- Feeding Assessment  Attends Breast-Feeding Classes: Yes  Breast-Feeding Experience: No  Breast Trauma/Surgery: No  Type/Quality: Good  Lactation Consultant Visits  Breast-Feedings: Good   Mother/Infant Observation  Mother Observation: Alignment, Breast comfortable, Close hold, Holds breast, Lets baby end feeding, Cramps  Infant Observation: Audible swallows, Breast tissue moves, Latches nipple and aereolae, Opens mouth, Lips flanged, upper, Lips flanged, lower  LATCH Documentation  Latch: Grasps breast, tongue down, lips flanged, rhythmic sucking  Audible Swallowing: Spontaneous and intermittent (24 hours old)  Type of Nipple: Everted (after stimulation)  Comfort (Breast/Nipple): Soft/non-tender  Hold (Positioning): No assist from staff, mother able to position/hold infant  LATCH Score: 10          Goal: *Weight loss less than 10% of birth weight  Outcome: Progressing Towards Goal  Encouraged mom to attempt feeding every 2-3 hours in the first couple of days. Looking for 8-12 feedings in 24 hours. Don't limit baby at breast, allow baby to come of breast on it's own. Baby may want to feed more often then every 2-3 hours. Baby may not feed that often, but feedings should be attempted.   If baby doesn't nurse,  Mom can hand express drops of colostrum and drip into baby's mouth, or  give to baby by finger feeding, cup feeding,or spoon feeding.  Encouraged skin to skin.      Mom agrees with plan     Problem: Patient Education: Go to Patient Education Activity  Goal: Patient/Family Education  Outcome: Progressing Towards Goal  Discussed ways to know if baby getting enough, ie, 4-6 wt diapers by day 7, return to birth weight by 2 weeks and a steady increase after that.

## 2017-06-23 VITALS
RESPIRATION RATE: 16 BRPM | DIASTOLIC BLOOD PRESSURE: 82 MMHG | WEIGHT: 167 LBS | BODY MASS INDEX: 27.82 KG/M2 | HEIGHT: 65 IN | TEMPERATURE: 98.9 F | HEART RATE: 51 BPM | OXYGEN SATURATION: 97 % | SYSTOLIC BLOOD PRESSURE: 130 MMHG

## 2017-06-23 PROCEDURE — 74011250637 HC RX REV CODE- 250/637: Performed by: OBSTETRICS & GYNECOLOGY

## 2017-06-23 RX ADMIN — IBUPROFEN 800 MG: 800 TABLET, FILM COATED ORAL at 07:03

## 2017-06-23 NOTE — LACTATION NOTE
This note was copied from a baby's chart. Mom states\" I just finished feeding the baby. \"  Instructed Mom to call the [facility] if she feeds again or before she leaves so I can do d/d teaching

## 2017-06-23 NOTE — LACTATION NOTE
This note was copied from a baby's chart. Pt will successfully establish breastfeeding by feeding in response to early feeding cues   or wake every 3h, will obtain deep latch, and will keep log of feedings/output. Taught to BF at hunger cues and or q 2-3 hrs and to offer 10-20 drops of hand expressed colostrum at any non-feeds. Breast Assessment  Left Breast: Large, Extra large  Left Nipple: Everted, Intact  Right Breast: Large, Extra large  Right Nipple: Everted, Intact  Breast- Feeding Assessment  Attends Breast-Feeding Classes: Yes  Breast-Feeding Experience: No  Breast Trauma/Surgery: No  Type/Quality: Good  Lactation Consultant Visits  Breast-Feedings: Good   Mother/Infant Observation  Mother Observation: Alignment, Holds breast, Lets baby end feeding, Nipple round on release, Breast comfortable, Recognizes feeding cues, Sleepy after feeding  Infant Observation: Latches nipple and aereolae, Rhythmic suck, Relaxed after feeding, Opens mouth, Lips flanged, upper, Lips flanged, lower, Audible swallows  LATCH Documentation  Latch: Grasps breast, tongue down, lips flanged, rhythmic sucking  Audible Swallowing: Spontaneous and intermittent (24 hours old)  Type of Nipple: Everted (after stimulation)  Comfort (Breast/Nipple): Soft/non-tender  Hold (Positioning): No assist from staff, mother able to position/hold infant  LATCH Score: 10  Chart shows numerous feedings, void, stool WNL. Discussed importance of monitoring outputs and feedings on first week of life. Discussed ways to tell if baby is  getting enough breast milk, ie  voids and stools, change in color of stool, and return to birth wt within 2 weeks. Follow up with pediatrician visit for weight check in 1-2 days (per AAP guidelines.)  Encouraged to call Warm [facility]  [phone] or [facility] at [phone] for any questions/problems that arise.  Mother also given breastfeeding support group dates and times for any future needsAnticipatory guidance given.  Questions answered. Discussed signs of baby's allergy, excema. Discussed engorgement management, when breast are soft and flat you are making more milk than when hard and engorged. If you should have to take a medication and MD says can't breast feed contact lactation office. Breast feed if you or the baby gets sick to pass along natural antibiotics. Confident breastfeeding Mom. Having good milk transfer. Discussed birth control and breastfeeding.

## 2017-06-23 NOTE — DISCHARGE INSTRUCTIONS
Section: What to Expect at [address] [place] [address]    A  section, or , is surgery to deliver your baby through a cut, called an incision, that the doctor makes in your lower belly and uterus. You may have some pain in your lower belly and need pain medicine for 1 to 2 weeks. You can expect some vaginal bleeding for several weeks. You will probably need about 6 weeks to fully recover. It is important to take it easy while the incision is healing. Avoid heavy lifting, strenuous activities, or exercises that strain the belly muscles while you are recovering. Ask a family member or friend for help with housework, cooking, and shopping. This care sheet gives you a general idea about how long it will take for you to recover. But each person recovers at a different pace. Follow the steps below to get better as quickly as possible. How can you care for yourself at home? Activity  · Rest when you feel tired. Getting enough sleep will help you recover. · Try to walk each day. Start by walking a little more than you did the day before. Bit by bit, increase the amount you walk. Walking boosts blood flow and helps prevent pneumonia, constipation, and blood clots. · Avoid strenuous activities, such as bicycle riding, jogging, weightlifting, and aerobic exercise, for 6 weeks or until your doctor says it is okay. · Until your doctor says it is okay, do not lift anything heavier than your baby. · Do not do sit-ups or other exercises that strain the belly muscles for 6 weeks or until your doctor says it is okay. · Hold a pillow over your incision when you cough or take deep breaths. This will support your belly and decrease your pain. · You may shower as usual. Pat the incision dry when you are done. · You will have some vaginal bleeding. Wear sanitary pads. Do not douche or use tampons until your doctor says it is okay. · Ask your doctor when you can drive again.   · You will probably need to take at least 6 weeks off work. It depends on the type of work you do and how you feel. · Ask your doctor when it is okay for you to have sex. Diet  · You can eat your normal diet. If your stomach is upset, try bland, low-fat foods like plain rice, broiled chicken, toast, and yogurt. · Drink plenty of fluids (unless your doctor tells you not to). · You may notice that your bowel movements are not regular right after your surgery. This is common. Try to avoid constipation and straining with bowel movements. You may want to take a fiber supplement every day. If you have not had a bowel movement after a couple of days, ask your doctor about taking a mild laxative. · If you are breastfeeding, do not drink any alcohol. Medicines  · Your doctor will tell you if and when you can restart your medicines. He or she will also give you instructions about taking any new medicines. · If you take blood thinners, such as warfarin (Coumadin), clopidogrel (Plavix), or aspirin, be sure to talk to your doctor. He or she will tell you if and when to start taking those medicines again. Make sure that you understand exactly what your doctor wants you to do. · Take pain medicines exactly as directed. ¨ If the doctor gave you a prescription medicine for pain, take it as prescribed. ¨ If you are not taking a prescription pain medicine, ask your doctor if you can take an over-the-counter medicine. · If you think your pain medicine is making you sick to your stomach:  ¨ Take your medicine after meals (unless your doctor has told you not to). ¨ Ask your doctor for a different pain medicine. · If your doctor prescribed antibiotics, take them as directed. Do not stop taking them just because you feel better. You need to take the full course of antibiotics. Incision care  · If you have strips of tape on the incision, leave the tape on for a week or until it falls off. · Wash the area daily with warm, soapy water, and pat it dry. Don't use hydrogen peroxide or alcohol, which can slow healing. You may cover the area with a gauze bandage if it weeps or rubs against clothing. Change the bandage every day. · Keep the area clean and dry. Other instructions  · If you breastfeed your baby, you may be more comfortable while you are healing if you place the baby so that he or she is not resting on your belly. Try tucking your baby under your arm, with his or her body along the side you will be feeding on. Support your baby's upper body with your arm. With that hand you can control your baby's head to bring his or her mouth to your breast. This is sometimes called the football hold. Follow-up care is a key part of your treatment and safety. Be sure to make and go to all appointments, and call your doctor if you are having problems. It's also a good idea to know your test results and keep a list of the medicines you take. When should you call for help? Call 911 anytime you think you may need emergency care. For example, call if:  · You passed out (lost consciousness). · You have symptoms of a blood clot in your lung (called a pulmonary embolism). These may include:  ¨ Sudden chest pain. ¨ Trouble breathing. ¨ Coughing up blood. · You have thoughts of harming yourself, your baby, or another person. Call your doctor now or seek immediate medical care if:  · You have severe vaginal bleeding. This means that you are soaking through a pad every hour for 2 or more hours. · You are dizzy or lightheaded, or you feel like you may faint. · You have new or more belly pain. · You have loose stitches, or your incision comes open. · You have symptoms of infection, such as:  ¨ Increased pain, swelling, warmth, or redness. ¨ Red streaks leading from the incision. ¨ Pus draining from the incision. ¨ A fever.   · You have symptoms of a blood clot in your leg (called a deep vein thrombosis), such as:  ¨ Pain in your calf, back of the knee, thigh, or groin.  ¨ Redness and swelling in your leg or groin. Watch closely for changes in your health, and be sure to contact your doctor if:  · You feel sad, anxious, or hopeless for more than a few days. · You do not get better as expected. Where can you learn more? Go to http://parish-fredrick.info/. Enter M806 in the search box to learn more about \" Section: What to Expect at Home. \"  Current as of: 2017  Content Version: 11.3  © 3145-3078 [employer]. Care instructions adapted under license by [employer] (which disclaims liability or warranty for this information). If you have questions about a medical condition or this instruction, always ask your healthcare professional. [address] any warranty or liability for your use of this information.

## 2017-06-23 NOTE — PROGRESS NOTES
Bedside shift change report given to FAISAL Randolph RN (oncoming nurse) by Ovi Potts RN (offgoing nurse). Report included the following information SBAR, Kardex, Intake/Output and MAR.

## 2017-06-23 NOTE — DISCHARGE SUMMARY
Obstetrical Discharge Summary     Name: Gabriela Snider MRN: [id number]  SSN: [id number]    YOB: 1985  Age: 33 y.o. Sex: female      Admit Date: 2017    Discharge Date: 2017     Admitting Physician: Michelle Skiff, MD     Attending Physician:  Elodia Zacarias MD     Admission Diagnoses: pregnancy  Pregnancy    Discharge Diagnoses:   Information for the patient's :  Pati Oleary, Female [[id number]]   Delivery of a 2.675 kg female infant via , Low Transverse on 2017 at 12:00 AM  by . Apgars were 8 and 9. Additional Diagnoses:   Hospital Problems  Never Reviewed          Codes Class Noted POA    Pregnancy ICD-10-CM: Z33.1  ICD-9-CM: V22.2  2017 Unknown             Lab Results   Component Value Date/Time    Rubella, External Immune 2016    GrBStrep, External Negative 2017       Hospital Course: Normal hospital course following the delivery. Disposition at Discharge: Home or self care    Discharged Condition: Stable    Patient Instructions:   Current Discharge Medication List      START taking these medications    Details   oxyCODONE-acetaminophen (PERCOCET) 5-325 mg per tablet Take 2 Tabs by mouth every six (6) hours as needed for Pain. Max Daily Amount: 8 Tabs. Qty: 30 Tab, Refills: 0      ibuprofen (MOTRIN) 800 mg tablet Take 1 Tab by mouth every eight (8) hours as needed for Pain. Qty: 30 Tab, Refills: 1         CONTINUE these medications which have NOT CHANGED    Details   prenatal vit-iron fumarate-fa (RIGHT STEP PRENATAL VITAMINS) 27 mg iron- 0.8 mg tab tablet Take 1 Tab by mouth daily. Reference my discharge instructions.     Follow-up Appointments   Procedures    FOLLOW UP VISIT Appointment in: Noemi Loving     Standing Status:   Standing     Number of Occurrences:   1     Order Specific Question:   Appointment in     Answer:   6 Weeks        Signed By:  Elodia Zacarias MD     2017

## 2021-09-08 LAB
ANTIBODY SCREEN, EXTERNAL: NEGATIVE
HBSAG, EXTERNAL: NEGATIVE
HIV, EXTERNAL: NEGATIVE
RPR, EXTERNAL: NONREACTIVE
RUBELLA, EXTERNAL: NORMAL
TYPE, ABO & RH, EXTERNAL: NORMAL

## 2022-03-02 LAB — GRBS, EXTERNAL: NEGATIVE

## 2022-03-14 ENCOUNTER — TRANSCRIBE ORDER (OUTPATIENT)
Dept: REGISTRATION | Age: 37
End: 2022-03-14

## 2022-03-14 ENCOUNTER — HOSPITAL ENCOUNTER (OUTPATIENT)
Dept: LAB | Age: 37
Discharge: HOME | End: 2022-03-14
Payer: COMMERCIAL

## 2022-03-14 DIAGNOSIS — Z01.812 PRE-PROCEDURAL LABORATORY EXAMINATIONS: ICD-10-CM

## 2022-03-14 DIAGNOSIS — Z01.812 PRE-PROCEDURAL LABORATORY EXAMINATIONS: Primary | ICD-10-CM

## 2022-03-14 LAB
SARS-COV-2, XPLCVT: NOT DETECTED
SOURCE, COVRS: NORMAL

## 2022-03-14 PROCEDURE — U0005 INFEC AGEN DETEC AMPLI PROBE: HCPCS

## 2022-03-18 PROBLEM — Z34.90 PREGNANCY: Status: ACTIVE | Noted: 2017-06-19

## 2022-03-31 ENCOUNTER — HOSPITAL ENCOUNTER (INPATIENT)
Age: 37
LOS: 4 days | Discharge: HOME | End: 2022-04-04
Attending: OBSTETRICS & GYNECOLOGY | Admitting: OBSTETRICS & GYNECOLOGY
Payer: COMMERCIAL

## 2022-03-31 DIAGNOSIS — G89.18 POSTOPERATIVE PAIN: Primary | ICD-10-CM

## 2022-03-31 LAB
ALBUMIN SERPL-MCNC: 2.9 G/DL (ref 3.5–5)
ALBUMIN/GLOB SERPL: 0.9 {RATIO} (ref 1.1–2.2)
ALP SERPL-CCNC: 133 U/L (ref 45–117)
ALT SERPL-CCNC: 26 U/L (ref 12–78)
ANION GAP SERPL CALC-SCNC: 6 MMOL/L (ref 5–15)
AST SERPL-CCNC: 22 U/L (ref 15–37)
BILIRUB SERPL-MCNC: 0.5 MG/DL (ref 0.2–1)
BUN SERPL-MCNC: 11 MG/DL (ref 6–20)
BUN/CREAT SERPL: 15 (ref 12–20)
CALCIUM SERPL-MCNC: 8.9 MG/DL (ref 8.5–10.1)
CHLORIDE SERPL-SCNC: 110 MMOL/L (ref 97–108)
CO2 SERPL-SCNC: 21 MMOL/L (ref 21–32)
CREAT SERPL-MCNC: 0.71 MG/DL (ref 0.55–1.02)
CREAT UR-MCNC: 295 MG/DL
ERYTHROCYTE [DISTWIDTH] IN BLOOD BY AUTOMATED COUNT: 14.3 % (ref 11.5–14.5)
GLOBULIN SER CALC-MCNC: 3.1 G/DL (ref 2–4)
GLUCOSE SERPL-MCNC: 101 MG/DL (ref 65–100)
HCT VFR BLD AUTO: 38.5 % (ref 35–47)
HGB BLD-MCNC: 13.1 G/DL (ref 11.5–16)
MCH RBC QN AUTO: 32.5 PG (ref 26–34)
MCHC RBC AUTO-ENTMCNC: 34 G/DL (ref 30–36.5)
MCV RBC AUTO: 95.5 FL (ref 80–99)
NRBC # BLD: 0 K/UL (ref 0–0.01)
NRBC BLD-RTO: 0 PER 100 WBC
PLATELET # BLD AUTO: 168 K/UL (ref 150–400)
PMV BLD AUTO: 10.5 FL (ref 8.9–12.9)
POTASSIUM SERPL-SCNC: 4 MMOL/L (ref 3.5–5.1)
PROT SERPL-MCNC: 6 G/DL (ref 6.4–8.2)
PROT UR-MCNC: 65 MG/DL (ref 0–11.9)
PROT/CREAT UR-RTO: 0.2
RBC # BLD AUTO: 4.03 M/UL (ref 3.8–5.2)
SODIUM SERPL-SCNC: 137 MMOL/L (ref 136–145)
WBC # BLD AUTO: 6.8 K/UL (ref 3.6–11)

## 2022-03-31 PROCEDURE — 65270000029 HC RM PRIVATE

## 2022-03-31 PROCEDURE — 4A1HXCZ MONITORING OF PRODUCTS OF CONCEPTION, CARDIAC RATE, EXTERNAL APPROACH: ICD-10-PCS | Performed by: OBSTETRICS & GYNECOLOGY

## 2022-03-31 PROCEDURE — 75410000002 HC LABOR FEE PER 1 HR: Performed by: OBSTETRICS & GYNECOLOGY

## 2022-03-31 PROCEDURE — 85027 COMPLETE CBC AUTOMATED: CPT

## 2022-03-31 PROCEDURE — 36415 COLL VENOUS BLD VENIPUNCTURE: CPT

## 2022-03-31 PROCEDURE — 3E033VJ INTRODUCTION OF OTHER HORMONE INTO PERIPHERAL VEIN, PERCUTANEOUS APPROACH: ICD-10-PCS | Performed by: OBSTETRICS & GYNECOLOGY

## 2022-03-31 PROCEDURE — 84156 ASSAY OF PROTEIN URINE: CPT

## 2022-03-31 PROCEDURE — 80053 COMPREHEN METABOLIC PANEL: CPT

## 2022-03-31 PROCEDURE — 74011250637 HC RX REV CODE- 250/637: Performed by: OBSTETRICS & GYNECOLOGY

## 2022-03-31 RX ORDER — LIDOCAINE HYDROCHLORIDE 10 MG/ML
10 INJECTION INFILTRATION; PERINEURAL ONCE
Status: ACTIVE | OUTPATIENT
Start: 2022-03-31 | End: 2022-04-01

## 2022-03-31 RX ORDER — SODIUM CHLORIDE 0.9 % (FLUSH) 0.9 %
5-40 SYRINGE (ML) INJECTION AS NEEDED
Status: DISCONTINUED | OUTPATIENT
Start: 2022-03-31 | End: 2022-04-04 | Stop reason: HOSPADM

## 2022-03-31 RX ORDER — ZOLPIDEM TARTRATE 5 MG/1
5 TABLET ORAL
Status: DISCONTINUED | OUTPATIENT
Start: 2022-03-31 | End: 2022-04-04 | Stop reason: HOSPADM

## 2022-03-31 RX ORDER — SODIUM CHLORIDE 0.9 % (FLUSH) 0.9 %
5-40 SYRINGE (ML) INJECTION EVERY 8 HOURS
Status: DISCONTINUED | OUTPATIENT
Start: 2022-03-31 | End: 2022-04-04 | Stop reason: HOSPADM

## 2022-03-31 RX ORDER — ONDANSETRON 2 MG/ML
4 INJECTION INTRAMUSCULAR; INTRAVENOUS
Status: DISCONTINUED | OUTPATIENT
Start: 2022-03-31 | End: 2022-04-01

## 2022-03-31 RX ORDER — OXYTOCIN/RINGER'S LACTATE 30/500 ML
87.3 PLASTIC BAG, INJECTION (ML) INTRAVENOUS AS NEEDED
Status: DISCONTINUED | OUTPATIENT
Start: 2022-03-31 | End: 2022-04-01

## 2022-03-31 RX ORDER — NALOXONE HYDROCHLORIDE 0.4 MG/ML
0.4 INJECTION, SOLUTION INTRAMUSCULAR; INTRAVENOUS; SUBCUTANEOUS AS NEEDED
Status: DISCONTINUED | OUTPATIENT
Start: 2022-03-31 | End: 2022-04-01 | Stop reason: HOSPADM

## 2022-03-31 RX ORDER — HYDROMORPHONE HYDROCHLORIDE 1 MG/ML
1 INJECTION, SOLUTION INTRAMUSCULAR; INTRAVENOUS; SUBCUTANEOUS
Status: DISCONTINUED | OUTPATIENT
Start: 2022-03-31 | End: 2022-04-01 | Stop reason: HOSPADM

## 2022-03-31 RX ORDER — OXYTOCIN/RINGER'S LACTATE 30/500 ML
0-20 PLASTIC BAG, INJECTION (ML) INTRAVENOUS
Status: DISCONTINUED | OUTPATIENT
Start: 2022-04-01 | End: 2022-04-04 | Stop reason: HOSPADM

## 2022-03-31 RX ORDER — OXYTOCIN/RINGER'S LACTATE 30/500 ML
10 PLASTIC BAG, INJECTION (ML) INTRAVENOUS AS NEEDED
Status: DISCONTINUED | OUTPATIENT
Start: 2022-03-31 | End: 2022-04-01

## 2022-03-31 RX ORDER — HYDROCODONE BITARTRATE AND ACETAMINOPHEN 5; 325 MG/1; MG/1
1 TABLET ORAL
Status: DISCONTINUED | OUTPATIENT
Start: 2022-03-31 | End: 2022-04-01

## 2022-03-31 RX ORDER — SODIUM CHLORIDE, SODIUM LACTATE, POTASSIUM CHLORIDE, CALCIUM CHLORIDE 600; 310; 30; 20 MG/100ML; MG/100ML; MG/100ML; MG/100ML
125 INJECTION, SOLUTION INTRAVENOUS CONTINUOUS
Status: DISCONTINUED | OUTPATIENT
Start: 2022-04-01 | End: 2022-04-04 | Stop reason: HOSPADM

## 2022-03-31 RX ADMIN — HYDROCODONE BITARTRATE AND ACETAMINOPHEN 1 TABLET: 5; 325 TABLET ORAL at 21:32

## 2022-03-31 NOTE — H&P
History & Physical    Name: Vega Trinh MRN: [id number]  SSN: [id number]    YOB: 1985  Age: 40 y.o. Sex: female        Subjective:     Estimated Date of Delivery: None noted. OB History        2    Para        Term                AB        Living   0       SAB        IAB        Ectopic        Molar        Multiple        Live Births                    Ms. Sadia Platt is admitted with pregnancy at 40wks 5 days for induction of labor. Prenatal course was complicated by prior c/s for stage 1 arrest and planning unmedicated . BPP  in office today. sent for IOL. pre-e eval pending. Please see prenatal records for details. Past Medical History:   Diagnosis Date    Abnormal Papanicolaou smear of cervix     Other unknown and unspecified cause of morbidity or mortality     Uterine fibroids     No past surgical history on file. Social History     Occupational History    Not on file   Tobacco Use    Smoking status: Never Smoker    Smokeless tobacco: Not on file   Substance and Sexual Activity    Alcohol use: No    Drug use: No    Sexual activity: Yes     Partners: Male     No family history on file. No Known Allergies  Prior to Admission medications    Medication Sig Start Date End Date Taking? Authorizing Provider   oxyCODONE-acetaminophen (PERCOCET) 5-325 mg per tablet Take 2 Tabs by mouth every six (6) hours as needed for Pain. Max Daily Amount: 8 Tabs. 17   Dung Fernandez MD   ibuprofen (MOTRIN) 800 mg tablet Take 1 Tab by mouth every eight (8) hours as needed for Pain. 17   Dung Fernandez MD   prenatal vit-iron fumarate-fa (RIGHT STEP PRENATAL VITAMINS) 27 mg iron- 0.8 mg tab tablet Take 1 Tab by mouth daily. Provider, Historical        Review of Systems: Pertinent items are noted in HPI. Objective:     Vitals: There were no vitals filed for this visit.      Physical Exam:  General NAD  CVS: RRR no r/mg  Pulmonary: CTAB  Abdm: gravid NT  Back: PRIYA CVA NT, spine NT  PRIYA LE NT w/o edema  [place]: irreg Q10  Membranes:  Intact  Fetal Heart Rate: Reactive  Baseline: 120 130 per minute  Variability: moderate  Accelerations: yes  Decelerations: none  SVE: 1/50/-2  EFW: 8 1/2  Cook placed w/o difficulty 60/60  gbs neg  Pre-e labs pending      Prenatal Labs:   Lab Results   Component Value Date/Time    Rubella, External Immune 11/14/2016 12:00 AM    GrBStrep, External Negative 05/09/2017 12:00 AM    HBsAg, External Negative 11/14/2016 12:00 AM    HIV, External Non Reactive 11/14/2016 12:00 AM    Gonorrhea, External Negative 03/03/2017 12:00 AM    Chlamydia, External Negative 03/03/2017 12:00 AM        Assessment/Plan:     Plan: Admit for IUP 40wks 5 days non reassuring fetal surveillance, elevated BP on arrival. TOLAC IOL. [facility], pit in am. labs pending. Plan/expectations/ logistics reviewed. all ques answered. .  Group B Strep was negative. Her CBC is pending. History of stage 1 arrest, planning TOLAC. PARQ held, risks/benefits reviewed, including but not limited to bleeding, infection, risk of rupture and potential maternal/fetal sequelae, risk of thrombosis, blood transfusion, surgical risks reviewed. All ques answered. Pt understands and agrees, consented.      Signed By:  Heather Davis MD     March 31, 2022

## 2022-03-31 NOTE — PROGRESS NOTES
1800 This RN orienting KATHRIN Rodriguez, RN    0251 Bedside and Verbal shift change report given to PATRICK Ellis RN (oncoming nurse) by Elaina Mackenzie RN (offgoing nurse). Report included the following information SBAR, MAR and Med Rec Status.

## 2022-03-31 NOTE — PROGRESS NOTES
26 Dr. Bert Frias at bedside discussing [address].      1809 Salguero bulb placed 60/60.     1811 VORB by Bert Frias MD pitocin 0600, pre-e labs, ambien 5mg prn once or norco 5-325 mg q4hr. MD ordered continuous monitoring and stated the pt can eat.

## 2022-04-01 ENCOUNTER — ANESTHESIA EVENT (OUTPATIENT)
Dept: LABOR AND DELIVERY | Age: 37
End: 2022-04-01
Payer: COMMERCIAL

## 2022-04-01 ENCOUNTER — ANESTHESIA (OUTPATIENT)
Dept: LABOR AND DELIVERY | Age: 37
End: 2022-04-01
Payer: COMMERCIAL

## 2022-04-01 PROCEDURE — 75410000000 HC DELIVERY VAGINAL/SINGLE: Performed by: OBSTETRICS & GYNECOLOGY

## 2022-04-01 PROCEDURE — 74011250636 HC RX REV CODE- 250/636: Performed by: OBSTETRICS & GYNECOLOGY

## 2022-04-01 PROCEDURE — 74011000250 HC RX REV CODE- 250: Performed by: ANESTHESIOLOGY

## 2022-04-01 PROCEDURE — 75410000003 HC RECOV DEL/VAG/CSECN EA 0.5 HR: Performed by: OBSTETRICS & GYNECOLOGY

## 2022-04-01 PROCEDURE — 76060000078 HC EPIDURAL ANESTHESIA: Performed by: ANESTHESIOLOGY

## 2022-04-01 PROCEDURE — 74011000250 HC RX REV CODE- 250

## 2022-04-01 PROCEDURE — 76060000078 HC EPIDURAL ANESTHESIA: Performed by: OBSTETRICS & GYNECOLOGY

## 2022-04-01 PROCEDURE — 65270000029 HC RM PRIVATE

## 2022-04-01 PROCEDURE — 76010000391 HC C SECN FIRST 1 HR: Performed by: OBSTETRICS & GYNECOLOGY

## 2022-04-01 PROCEDURE — 74011000250 HC RX REV CODE- 250: Performed by: OBSTETRICS & GYNECOLOGY

## 2022-04-01 PROCEDURE — 74011250636 HC RX REV CODE- 250/636: Performed by: NURSE ANESTHETIST, CERTIFIED REGISTERED

## 2022-04-01 PROCEDURE — 77030018809 HC RETRCTR ALXSO DISP AMR -B

## 2022-04-01 PROCEDURE — 65410000002 HC RM PRIVATE OB

## 2022-04-01 PROCEDURE — 74011250636 HC RX REV CODE- 250/636

## 2022-04-01 PROCEDURE — 74011000250 HC RX REV CODE- 250: Performed by: NURSE ANESTHETIST, CERTIFIED REGISTERED

## 2022-04-01 PROCEDURE — 77030040361 HC SLV COMPR DVT MDII -B

## 2022-04-01 PROCEDURE — 75410000002 HC LABOR FEE PER 1 HR: Performed by: OBSTETRICS & GYNECOLOGY

## 2022-04-01 PROCEDURE — 77030039147 HC PWDR HEMSTS SURGICEL JNJ -D

## 2022-04-01 PROCEDURE — 76010000392 HC C SECN EA ADDL 0.5 HR: Performed by: OBSTETRICS & GYNECOLOGY

## 2022-04-01 PROCEDURE — 77030005513 HC CATH URETH FOL11 MDII -B

## 2022-04-01 PROCEDURE — 2709999900 HC NON-CHARGEABLE SUPPLY

## 2022-04-01 PROCEDURE — 77030014125 HC TY EPDRL BBMI -B: Performed by: ANESTHESIOLOGY

## 2022-04-01 RX ORDER — SODIUM CHLORIDE 0.9 % (FLUSH) 0.9 %
5-40 SYRINGE (ML) INJECTION AS NEEDED
Status: DISCONTINUED | OUTPATIENT
Start: 2022-04-01 | End: 2022-04-04 | Stop reason: HOSPADM

## 2022-04-01 RX ORDER — SIMETHICONE 80 MG
80 TABLET,CHEWABLE ORAL AS NEEDED
Status: DISCONTINUED | OUTPATIENT
Start: 2022-04-01 | End: 2022-04-04 | Stop reason: HOSPADM

## 2022-04-01 RX ORDER — SODIUM CHLORIDE, SODIUM LACTATE, POTASSIUM CHLORIDE, CALCIUM CHLORIDE 600; 310; 30; 20 MG/100ML; MG/100ML; MG/100ML; MG/100ML
125 INJECTION, SOLUTION INTRAVENOUS CONTINUOUS
Status: DISCONTINUED | OUTPATIENT
Start: 2022-04-01 | End: 2022-04-04 | Stop reason: HOSPADM

## 2022-04-01 RX ORDER — CEFAZOLIN SODIUM 1 G/3ML
INJECTION, POWDER, FOR SOLUTION INTRAMUSCULAR; INTRAVENOUS
Status: DISPENSED
Start: 2022-04-01 | End: 2022-04-02

## 2022-04-01 RX ORDER — FENTANYL/BUPIVACAINE/NS/PF 2-1250MCG
1-16 PREFILLED PUMP RESERVOIR EPIDURAL CONTINUOUS
Status: DISCONTINUED | OUTPATIENT
Start: 2022-04-01 | End: 2022-04-01 | Stop reason: HOSPADM

## 2022-04-01 RX ORDER — NALOXONE HYDROCHLORIDE 0.4 MG/ML
0.4 INJECTION, SOLUTION INTRAMUSCULAR; INTRAVENOUS; SUBCUTANEOUS AS NEEDED
Status: DISCONTINUED | OUTPATIENT
Start: 2022-04-01 | End: 2022-04-04 | Stop reason: HOSPADM

## 2022-04-01 RX ORDER — OXYTOCIN 10 [USP'U]/ML
INJECTION, SOLUTION INTRAMUSCULAR; INTRAVENOUS AS NEEDED
Status: DISCONTINUED | OUTPATIENT
Start: 2022-04-01 | End: 2022-04-01

## 2022-04-01 RX ORDER — LIDOCAINE HYDROCHLORIDE AND EPINEPHRINE 20; 5 MG/ML; UG/ML
INJECTION, SOLUTION EPIDURAL; INFILTRATION; INTRACAUDAL; PERINEURAL AS NEEDED
Status: DISCONTINUED | OUTPATIENT
Start: 2022-04-01 | End: 2022-04-01 | Stop reason: HOSPADM

## 2022-04-01 RX ORDER — ONDANSETRON 2 MG/ML
4 INJECTION INTRAMUSCULAR; INTRAVENOUS
Status: DISCONTINUED | OUTPATIENT
Start: 2022-04-01 | End: 2022-04-04 | Stop reason: HOSPADM

## 2022-04-01 RX ORDER — ACETAMINOPHEN 325 MG/1
650 TABLET ORAL
Status: DISCONTINUED | OUTPATIENT
Start: 2022-04-01 | End: 2022-04-04 | Stop reason: HOSPADM

## 2022-04-01 RX ORDER — DOCUSATE SODIUM 100 MG/1
100 CAPSULE, LIQUID FILLED ORAL 2 TIMES DAILY
Status: DISCONTINUED | OUTPATIENT
Start: 2022-04-01 | End: 2022-04-04 | Stop reason: HOSPADM

## 2022-04-01 RX ORDER — OXYTOCIN/RINGER'S LACTATE 30/500 ML
10 PLASTIC BAG, INJECTION (ML) INTRAVENOUS AS NEEDED
Status: DISCONTINUED | OUTPATIENT
Start: 2022-04-01 | End: 2022-04-04 | Stop reason: HOSPADM

## 2022-04-01 RX ORDER — NALBUPHINE HYDROCHLORIDE 10 MG/ML
2.5 INJECTION, SOLUTION INTRAMUSCULAR; INTRAVENOUS; SUBCUTANEOUS
Status: DISCONTINUED | OUTPATIENT
Start: 2022-04-01 | End: 2022-04-04 | Stop reason: HOSPADM

## 2022-04-01 RX ORDER — BUPIVACAINE HYDROCHLORIDE 2.5 MG/ML
INJECTION, SOLUTION EPIDURAL; INFILTRATION; INTRACAUDAL AS NEEDED
Status: DISCONTINUED | OUTPATIENT
Start: 2022-04-01 | End: 2022-04-01 | Stop reason: HOSPADM

## 2022-04-01 RX ORDER — ONDANSETRON 2 MG/ML
INJECTION INTRAMUSCULAR; INTRAVENOUS AS NEEDED
Status: DISCONTINUED | OUTPATIENT
Start: 2022-04-01 | End: 2022-04-01

## 2022-04-01 RX ORDER — OXYTOCIN/RINGER'S LACTATE 30/500 ML
87.3 PLASTIC BAG, INJECTION (ML) INTRAVENOUS AS NEEDED
Status: DISCONTINUED | OUTPATIENT
Start: 2022-04-01 | End: 2022-04-04 | Stop reason: HOSPADM

## 2022-04-01 RX ORDER — TERBUTALINE SULFATE 1 MG/ML
INJECTION SUBCUTANEOUS
Status: DISPENSED
Start: 2022-04-01 | End: 2022-04-02

## 2022-04-01 RX ORDER — PHENYLEPHRINE HCL IN 0.9% NACL 0.4MG/10ML
SYRINGE (ML) INTRAVENOUS AS NEEDED
Status: DISCONTINUED | OUTPATIENT
Start: 2022-04-01 | End: 2022-04-01 | Stop reason: HOSPADM

## 2022-04-01 RX ORDER — IBUPROFEN 800 MG/1
800 TABLET ORAL EVERY 8 HOURS
Status: DISCONTINUED | OUTPATIENT
Start: 2022-04-01 | End: 2022-04-04 | Stop reason: HOSPADM

## 2022-04-01 RX ORDER — METHYLERGONOVINE MALEATE 0.2 MG/ML
INJECTION INTRAVENOUS AS NEEDED
Status: DISCONTINUED | OUTPATIENT
Start: 2022-04-01 | End: 2022-04-01

## 2022-04-01 RX ORDER — EPHEDRINE SULFATE/0.9% NACL/PF 50 MG/5 ML
SYRINGE (ML) INTRAVENOUS AS NEEDED
Status: DISCONTINUED | OUTPATIENT
Start: 2022-04-01 | End: 2022-04-01 | Stop reason: HOSPADM

## 2022-04-01 RX ORDER — NALOXONE HYDROCHLORIDE 0.4 MG/ML
0.4 INJECTION, SOLUTION INTRAMUSCULAR; INTRAVENOUS; SUBCUTANEOUS AS NEEDED
Status: DISCONTINUED | OUTPATIENT
Start: 2022-04-01 | End: 2022-04-01 | Stop reason: HOSPADM

## 2022-04-01 RX ORDER — WATER FOR INJECTION,STERILE
VIAL (ML) INJECTION
Status: DISPENSED
Start: 2022-04-01 | End: 2022-04-02

## 2022-04-01 RX ORDER — SODIUM CHLORIDE 0.9 % (FLUSH) 0.9 %
5-40 SYRINGE (ML) INJECTION EVERY 8 HOURS
Status: DISCONTINUED | OUTPATIENT
Start: 2022-04-01 | End: 2022-04-04 | Stop reason: HOSPADM

## 2022-04-01 RX ORDER — HYDROCODONE BITARTRATE AND ACETAMINOPHEN 5; 325 MG/1; MG/1
1-2 TABLET ORAL
Status: DISCONTINUED | OUTPATIENT
Start: 2022-04-01 | End: 2022-04-04 | Stop reason: HOSPADM

## 2022-04-01 RX ORDER — MORPHINE SULFATE 0.5 MG/ML
INJECTION, SOLUTION EPIDURAL; INTRATHECAL; INTRAVENOUS AS NEEDED
Status: DISCONTINUED | OUTPATIENT
Start: 2022-04-01 | End: 2022-04-01 | Stop reason: HOSPADM

## 2022-04-01 RX ORDER — EPHEDRINE SULFATE/0.9% NACL/PF 50 MG/5 ML
10 SYRINGE (ML) INTRAVENOUS
Status: DISCONTINUED | OUTPATIENT
Start: 2022-04-01 | End: 2022-04-01 | Stop reason: HOSPADM

## 2022-04-01 RX ADMIN — LIDOCAINE HYDROCHLORIDE,EPINEPHRINE BITARTRATE 4 ML: 20; .005 INJECTION, SOLUTION EPIDURAL; INFILTRATION; INTRACAUDAL; PERINEURAL at 16:45

## 2022-04-01 RX ADMIN — SODIUM CHLORIDE, POTASSIUM CHLORIDE, SODIUM LACTATE AND CALCIUM CHLORIDE: 600; 310; 30; 20 INJECTION, SOLUTION INTRAVENOUS at 17:16

## 2022-04-01 RX ADMIN — CEFAZOLIN SODIUM 2 G: 1 POWDER, FOR SOLUTION INTRAMUSCULAR; INTRAVENOUS at 17:07

## 2022-04-01 RX ADMIN — SODIUM CHLORIDE, POTASSIUM CHLORIDE, SODIUM LACTATE AND CALCIUM CHLORIDE: 600; 310; 30; 20 INJECTION, SOLUTION INTRAVENOUS at 18:11

## 2022-04-01 RX ADMIN — Medication 10 ML/HR: at 09:58

## 2022-04-01 RX ADMIN — Medication 5 MG: at 17:57

## 2022-04-01 RX ADMIN — Medication 80 MCG: at 17:09

## 2022-04-01 RX ADMIN — BUPIVACAINE HYDROCHLORIDE 10 ML: 2.5 INJECTION, SOLUTION EPIDURAL; INFILTRATION; INTRACAUDAL; PERINEURAL at 09:09

## 2022-04-01 RX ADMIN — LIDOCAINE HYDROCHLORIDE,EPINEPHRINE BITARTRATE 4 ML: 20; .005 INJECTION, SOLUTION EPIDURAL; INFILTRATION; INTRACAUDAL; PERINEURAL at 17:03

## 2022-04-01 RX ADMIN — OXYTOCIN 40 UNITS: 10 INJECTION, SOLUTION INTRAMUSCULAR; INTRAVENOUS at 17:17

## 2022-04-01 RX ADMIN — NALBUPHINE HYDROCHLORIDE 2.5 MG: 10 INJECTION, SOLUTION INTRAMUSCULAR; INTRAVENOUS; SUBCUTANEOUS at 19:37

## 2022-04-01 RX ADMIN — Medication 40 MCG: at 17:12

## 2022-04-01 RX ADMIN — SODIUM CHLORIDE, POTASSIUM CHLORIDE, SODIUM LACTATE AND CALCIUM CHLORIDE: 600; 310; 30; 20 INJECTION, SOLUTION INTRAVENOUS at 16:57

## 2022-04-01 RX ADMIN — Medication 40 MCG: at 17:17

## 2022-04-01 RX ADMIN — Medication 5 MG: at 17:50

## 2022-04-01 RX ADMIN — LIDOCAINE HYDROCHLORIDE,EPINEPHRINE BITARTRATE 4 ML: 20; .005 INJECTION, SOLUTION EPIDURAL; INFILTRATION; INTRACAUDAL; PERINEURAL at 16:58

## 2022-04-01 RX ADMIN — SODIUM CHLORIDE, POTASSIUM CHLORIDE, SODIUM LACTATE AND CALCIUM CHLORIDE 125 ML/HR: 600; 310; 30; 20 INJECTION, SOLUTION INTRAVENOUS at 04:49

## 2022-04-01 RX ADMIN — ONDANSETRON HYDROCHLORIDE 4 MG: 2 SOLUTION INTRAMUSCULAR; INTRAVENOUS at 17:40

## 2022-04-01 RX ADMIN — MORPHINE SULFATE 3 MG: 0.5 INJECTION, SOLUTION EPIDURAL; INTRATHECAL; INTRAVENOUS at 17:20

## 2022-04-01 RX ADMIN — OXYTOCIN 2 MILLI-UNITS/MIN: 10 INJECTION INTRAVENOUS at 06:17

## 2022-04-01 NOTE — ANESTHESIA PROCEDURE NOTES
Epidural Block    Patient location during procedure: OB  Start time: 4/1/2022 9:01 AM  End time: 4/1/2022 9:09 AM  Reason for block: labor epidural  Staffing  Performed: attending   Anesthesiologist: Marisa Sandhu MD  Preanesthetic Checklist  Completed: patient identified, IV checked, site marked, risks and benefits discussed, surgical consent, monitors and equipment checked, pre-op evaluation and timeout performed  Block Placement  Patient position: sitting  Prep: ChloraPrep  Sterility prep: cap, drape, gloves, gown, hand and mask  Sedation level: no sedation  Patient monitoring: continuous pulse oximetry, frequent blood pressure checks and heart rate  Approach: midline  Location: lumbar  Epidural  Loss of resistance technique: air  Guidance: landmark technique  Needle  Needle type: Tuohy   Needle gauge: 17 G  Needle length: 9 cm  Catheter type: multi-orifice  Catheter size: 20 G  Catheter securement method: clear occlusive dressing and surgical tape  Test dose: negative  Assessment  Sensory level: T10  Block outcome: pain improved  Number of attempts: 1  Procedure assessment: patient tolerated procedure well with no immediate complications

## 2022-04-01 NOTE — PROGRESS NOTES
1930 Pt standing next to her bed eating her dinner. Pt verbalizes no complaints. 2005 Pt up to the bathroom. 0030 Care of pt turned over to [address] of pt resumed. 0530 Pt up to the shower    0710 SBAR report given KATHRIN Love RN

## 2022-04-01 NOTE — PROGRESS NOTES
Labor Note    Halley Gómez  [id number]  1985   40w6d      S:  Feeling comfortable. CTSP 2/2 decelerations. O:    Visit Vitals  BP (!) 105/58   Pulse 78   Temp (P) 99.3 °F (37.4 °C)   Resp 18   Ht 5' 4.5\" (1.638 m)   Wt 84.4 kg (186 lb)   SpO2 98%   BMI 31.43 kg/m²     Cervical Exam /-    Membrane Status: SROM    FSE/IUPC placed     Patient Vitals for the past 4 hrs: Mode Fetal Heart Rate Fetal Activity Variability Decelerations Accelerations RN Reviewed Strip? FHR Interventions   22 1301 (P) US Readjusted      (P) Yes    22 1300 (P) External (P) 135  (P) 6-25 BPM (P) Variable (P) Yes (P) Yes    22 1250       (P) Yes    22 1248        Lateral Right;Other (comment)   22 1234 External 135  6-25 BPM Variable Yes Yes    22 1215       (P) Yes    22 1200 External 125  6-25 BPM Variable Yes Yes    22 1130 External 130 Present 6-25 BPM Prolonged Yes Yes    22 1115       Yes    22 1100 External 135  6-25 BPM None Yes Yes    22 1045       (P) Yes    22 1030 External   6-25 BPM None Yes Yes        A/P:  40 y.o.  @ 40w6d - IOL, TOLAC    Category II tracing. Discussed decelerations and h/o LTCS. She allowed me to place internal monitors. Discussed how decelerations can progress to prolonged and then would need emergent c section for delivery. She reiterated that she does not desire a c/section at this point despite tracing. Continue close monitoring. Have terbutaline in the room, however contractions are spaced apart and not strong MVUs. Did not feel this was warranted at this point.    ________  2:19 PM    Additional d/w her and her  about their threshold for proceeding with c/s. Explained recurrent nature of the late decelerations, moderate variability. Baby tolerating knee/chest better, however would not give pitocin.   I would recommend repeat c/s but she is declining at this point. Will continue to monitor closely.      Krishan Patel MD  [facility]

## 2022-04-01 NOTE — PROGRESS NOTES
0725 Shift assessment done. Plan of care discussed. Pt denies any pain from contractions at this time. Call bell within reach. 0745 SROM/ clear fluid. 0800 Epidural bolus started at pt's request.    Jennifer Jacinto at bedside to evaluate pt. Cook cath removed following gentle traction per Dr Braulio Jacinto. 0914 Dr David Reyes at bedside to place epidural.    3441 Pt positioned for epidural./time out done. 0940 SVE 6-7/80-2. Pt placed left lateral.    0942  Bolus started    0948 right lateral with peanut ball in place. 0955 Pt placed in tredelenburg. Bolus in progress. 3885 Dr Braulio Jacinto at bedside to evaluate pt. SVE 6-7/80/-2.    1119 Pt turned to left than back to right following prolonged decel. 1155 Pt placed in high logan with legs butterflied out. 5 Dr Braulio Jacinto at bedside. SVE 8/100/-1. FSE/IUPC placed. 1406 Pt placed in hands and knees position. Dr Braulio Jacinto at bedside    1622 Pt turned right lateral with peanut ball in place. [address] Sami Vázquez at bedside. SVE 8/100/-1. C/s Called for NRFHR.    1702 In OR 1    1703 's    1716 Repeat c/s of viable infant male. 1815     1821 Return to room 205 via stretcher.     1905 Shift report given to [facility]

## 2022-04-01 NOTE — PROGRESS NOTES
Labor Progress Note  CTSP regarding deceleration. Patient seen, fetal heart rate and contraction pattern evaluated, patient examined. Patient Vitals for the past 2 hrs:   BP Temp Pulse Resp SpO2   04/01/22 1635     100 %   04/01/22 1630 106/62 98.8 °F (37.1 °C) (!) 103 20 97 %   04/01/22 1625     99 %   04/01/22 1620     99 %   04/01/22 1615     100 %   04/01/22 1611 136/64  96     04/01/22 1610     100 %   04/01/22 1605     100 %   04/01/22 1600     100 %   04/01/22 1555     100 %   04/01/22 1550 127/63  86  100 %   04/01/22 1545     100 %   04/01/22 1540     100 %   04/01/22 1535     100 %   04/01/22 1531 138/67  89     04/01/22 1530     99 %   04/01/22 1525     100 %   04/01/22 1520     100 %   04/01/22 1515     100 %   04/01/22 1510  99.6 °F (37.6 °C)  18 99 %   04/01/22 1505     100 %   04/01/22 1500     100 %       Physical Exam:  Cervical Exam:  8/90/-1 with swelling of cervix  Uterine Activity: q 2-5 min pit off, pt on side, o2 on  Fetal Heart Rate: cat 2    Assessment/Plan: IUP at 40w6d IOL, TOLAC    No change in cervix over several hourse with non reassuring fetal status. Recommend proceeding with RLTCS. Reviewed risks with patient and FOB in detail and all questions answered. Will move urgency to OR for delivery.   GBS negative  Ancef    Geovanna Nathan DO, [address] [facility],[address] [facility]

## 2022-04-01 NOTE — PROGRESS NOTES
Labor Note    Krystal Harris  [id number]  1985   40w6d      S:  Feeling increasing pain with contractions. Desires epidural     O:    Visit Vitals  /63   Pulse 72   Temp 98.5 °F (36.9 °C)   Resp 16   Ht 5' 4.5\" (1.638 m)   Wt 84.4 kg (186 lb)   SpO2 99%   BMI 31.43 kg/m²     Cervical Exam  Membrane Status: SROM     FB removed with gentle traction. Exam deferred 2/2 discomfort. Patient Vitals for the past 4 hrs: Mode Fetal Heart Rate Fetal Activity Variability Decelerations Accelerations RN Reviewed Strip?   22 0729 External 135  6-25 BPM None Yes Yes   22 0645 External 135  6-25 BPM Early Yes Yes   22 0630 External 135 Present 6-25 BPM None No Yes   22 0529 External 135  6-25 BPM Variable Yes Yes   22 0500 External 130  6-25 BPM (!) Variable;Late No Yes       A/P:  40 y.o.  @ 40w6d - IOL / TOLAC   1. CEFM/[place]  2. GBS neg / Rh+  3. Pitocin per protocol when comfortable. 4.  Pain control - epidural   5. Nina prn. Expect .       Sara Hartmann MD  [facility]

## 2022-04-01 NOTE — DISCHARGE SUMMARY
Obstetrical Discharge Summary     Name: Mary Mathews MRN: [id number]  SSN: [id number]    YOB: 1985  Age: 40 y.o. Sex: female      Allergies: Patient has no known allergies. Admit Date: 3/31/2022    Discharge Date: 4/3/22     Admitting Physician: Alana Gomez MD     Attending Physician:  Israel Ken MD     * Admission Diagnoses: Pregnancy [Z34.90]    * Discharge Diagnoses:   Information for the patient's :  Brandyn Talamantes [[id number]]   Delivery of a   male infant via  on 2022 at 5:16 PM  by Scar Palacios. Apgars were 9  and 9 . Additional Diagnoses:   Hospital Problems as of 2022 Never Reviewed          Codes Class Noted - Resolved POA    Pregnancy ICD-10-CM: Z34.90  ICD-9-CM: V22.2  2017 - Present Unknown             Lab Results   Component Value Date/Time    Rubella, External immune 2021 12:00 AM    GrBStrep, External negative 2022 12:00 AM    ABO,Rh A postive  2021 12:00 AM    There is no immunization history for the selected administration types on file for this patient. * Procedures: RLTCS, failed TOLAC,  Procedure(s):   SECTION           * Discharge Condition: good    * Hospital Course: Normal hospital course following the delivery. * Disposition: Home    Discharge Medications:   Current Discharge Medication List          * Follow-up Care/Patient Instructions:   Activity: Activity as tolerated  Diet: Regular Diet  Wound Care: As directed    Follow-up Information    None

## 2022-04-01 NOTE — BRIEF OP NOTE
Brief Postoperative Note    Patient: Abeba Dos Santos  YOB: 1985  MRN: [id number]    Date of Procedure: 2022     Pre-Op Diagnosis: Non Reassuring Fetal Heart Tracing    Post-Op Diagnosis: Same as preoperative diagnosis. Previous c/s x 1, failed TOLAC  Procedure(s):   SECTION    Surgeon(s):  Payton Obrien DO    Surgical Assistant: Surg Asst-1: Link Garnett    Anesthesia: Spinal     Estimated Blood Loss (mL): 119PL    Complications: None    Specimens: * No specimens in log *     Implants: * No implants in log *    Drains: * No LDAs found *    Findings: VMI, 7lb 6oz, apgar 9/9. Thin CHRISTOFER. Acynclitic presentation with significant pelvic molding. Clear fluid. Blood noted in christopher tubing after delivery; bladder backfilled with sterile milk and no abnormality noted after careful thorough inspection.       Electronically Signed by Varinder Gorman DO on 2022 at 6:07 PM

## 2022-04-02 LAB
ERYTHROCYTE [DISTWIDTH] IN BLOOD BY AUTOMATED COUNT: 14.5 % (ref 11.5–14.5)
HCT VFR BLD AUTO: 35 % (ref 35–47)
HGB BLD-MCNC: 11.9 G/DL (ref 11.5–16)
MCH RBC QN AUTO: 32.8 PG (ref 26–34)
MCHC RBC AUTO-ENTMCNC: 34 G/DL (ref 30–36.5)
MCV RBC AUTO: 96.4 FL (ref 80–99)
NRBC # BLD: 0 K/UL (ref 0–0.01)
NRBC BLD-RTO: 0 PER 100 WBC
PLATELET # BLD AUTO: 163 K/UL (ref 150–400)
PMV BLD AUTO: 11.6 FL (ref 8.9–12.9)
RBC # BLD AUTO: 3.63 M/UL (ref 3.8–5.2)
WBC # BLD AUTO: 18.1 K/UL (ref 3.6–11)

## 2022-04-02 PROCEDURE — 36415 COLL VENOUS BLD VENIPUNCTURE: CPT

## 2022-04-02 PROCEDURE — 74011250636 HC RX REV CODE- 250/636: Performed by: OBSTETRICS & GYNECOLOGY

## 2022-04-02 PROCEDURE — 65270000029 HC RM PRIVATE

## 2022-04-02 PROCEDURE — 85027 COMPLETE CBC AUTOMATED: CPT

## 2022-04-02 PROCEDURE — 74011250637 HC RX REV CODE- 250/637: Performed by: OBSTETRICS & GYNECOLOGY

## 2022-04-02 RX ORDER — KETOROLAC TROMETHAMINE 30 MG/ML
30 INJECTION, SOLUTION INTRAMUSCULAR; INTRAVENOUS
Status: DISCONTINUED | OUTPATIENT
Start: 2022-04-02 | End: 2022-04-04 | Stop reason: HOSPADM

## 2022-04-02 RX ADMIN — DOCUSATE SODIUM 100 MG: 100 CAPSULE, LIQUID FILLED ORAL at 11:13

## 2022-04-02 RX ADMIN — DOCUSATE SODIUM 100 MG: 100 CAPSULE, LIQUID FILLED ORAL at 18:37

## 2022-04-02 RX ADMIN — IBUPROFEN 800 MG: 800 TABLET, FILM COATED ORAL at 11:12

## 2022-04-02 RX ADMIN — IBUPROFEN 800 MG: 800 TABLET, FILM COATED ORAL at 18:37

## 2022-04-02 RX ADMIN — KETOROLAC TROMETHAMINE 30 MG: 30 INJECTION, SOLUTION INTRAMUSCULAR at 02:54

## 2022-04-02 RX ADMIN — SODIUM CHLORIDE, POTASSIUM CHLORIDE, SODIUM LACTATE AND CALCIUM CHLORIDE 125 ML/HR: 600; 310; 30; 20 INJECTION, SOLUTION INTRAVENOUS at 03:00

## 2022-04-02 NOTE — L&D DELIVERY NOTE
Delivery Summary    Patient: Halley Gómez MRN: [id number]  SSN: [id number]    YOB: 1985  Age: 40 y.o. Sex: female        Information for the patient's :  Mauricio Garduno [[id number]]       Labor Events:    Labor: No    Steroids:     Cervical Ripening Date/Time:       Cervical Ripening Type:     Antibiotics During Labor: No   Rupture Identifier: Sac 1    Rupture Date/Time:   7:45 AM   Rupture Type: SROM   Amniotic Fluid Volume:      Amniotic Fluid Description: Clear    Amniotic Fluid Odor: None    Induction: Salguero Bulb (balloon); Oxytocin       Induction Date/Time:        Indications for Induction: Other(comment)    Augmentation:     Augmentation Date/Time:      Indications for Augmentation:     Labor complications: Fetal Intolerance       Additional complications:        Delivery Events:  Indications For Episiotomy:     Episiotomy:     Perineal Laceration(s):     Repaired:     Periurethral Laceration Location:      Repaired:     Labial Laceration Location:     Repaired:     Sulcal Laceration Location:     Repaired:     Vaginal Laceration Location:     Repaired:     Cervical Laceration Location:     Repaired:     Repair Suture:     Number of Repair Packets:     Estimated Blood Loss (ml):  ml   Quantitaive Blood Loss (ml):             Delivery Date: 2022    Delivery Time: 5:16 PM   Delivery Type:       Details    Trial of Labor:     Primary/Repeat:     Priority:     Indications:          Sex:  Male     Gestational Age: 38w9d  Delivery Clinician:  Anthony Mercer  Living Status: Living   Delivery Location: OR            APGARS  One minute Five minutes Ten minutes   Skin color: 1   1        Heart rate: 2   2        Grimace: 2   2        Muscle tone: 2   2        Breathin   2        Totals: 9   9          Presentation: Vertex    Position:        Resuscitation Method:  Suctioning-bulb; Tactile Stimulation     Meconium Stained: None      Cord Information: 3 Vessels  Complications: None  Cord around:    Delayed cord clamping? Yes  Cord clamped date/time:2022  5:18 PM  Disposition of Cord Blood: Discard    Blood Gases Sent?: No    Placenta:  Date/Time: 2022  5:19 PM  Removal: Manual Removal      Appearance: Normal      Measurements:  Birth Weight: 3.345 kg      Birth Length: 48.3 cm      Head Circumference: 36 cm      Chest Circumference: 34.5 cm     Abdominal Girth: 33.5 cm    Other Providers:   ;Qian KATHLEEN;;DINH HATCH;;;PAPO MURRAY;DEJA FUNEZ, Obstetrician;Primary Nurse;Primary  Nurse;Nicu Nurse;Neonatologist;Anesthesiologist;Crna;Nurse Practitioner;Midwife;Nursery Nurse             Group B Strep:   Lab Results   Component Value Date/Time    Breetrefrancoise, External negative 2022 12:00 AM     Information for the patient's :  Leif Eldridge [[id number]]   No results found for: ABORH, PCTABR, PCTDIG, BILI, ABORHEXT, ABORH     No results for input(s): PCO2CB, PO2CB, HCO3I, SO2I, IBD, PTEMPI, SPECTI, PHICB, ISITE, IDEV, IALLEN in the last 72 hours.

## 2022-04-02 NOTE — PROGRESS NOTES
Post-Operative  Day 1    Audelia Conner         Information for the patient's :  Donya Khanna [[id number]]    Patient doing well without unusual complaints. Pain well controlled with PO pain medication. Voiding without difficulty. Ambulating. Flatus not yet present. Tolerating regular diet. Vitals:  Visit Vitals  /62   Pulse 60   Temp 97.7 °F (36.5 °C)   Resp 18   Ht 5' 4.5\" (1.638 m)   Wt 84.4 kg (186 lb)   SpO2 97%   Breastfeeding Unknown   BMI 31.43 kg/m²     Temp (24hrs), Av.8 °F (37.1 °C), Min:97.7 °F (36.5 °C), Max:100.3 °F (37.9 °C)      Last 24hr Input/Output:    Intake/Output Summary (Last 24 hours) at 2022 1055  Last data filed at 2022 0945  Gross per 24 hour   Intake 2020 ml   Output 1200 ml   Net 820 ml          Exam:       Patient without distress. Abdomen:soft, expected mild tenderness, fundus firm @U-2, dressing c/d/i  Lower extremities are no tenderness mild with edema.     Labs:   Lab Results   Component Value Date/Time    WBC 18.1 (H) 2022 03:48 AM    WBC 6.8 2022 05:59 PM    WBC 8.9 2017 04:29 AM    WBC 8.1 2017 01:28 AM    HGB 11.9 2022 03:48 AM    HGB 13.1 2022 05:59 PM    HGB 11.0 (L) 2017 04:29 AM    HGB 13.2 2017 01:28 AM    HCT 35.0 2022 03:48 AM    HCT 38.5 2022 05:59 PM    HCT 32.2 (L) 2017 04:29 AM    HCT 37.8 2017 01:28 AM    PLATELET 584  03:48 AM    PLATELET 621  05:59 PM    PLATELET 222  04:29 AM    PLATELET 263  01:28 AM       Recent Results (from the past 24 hour(s))   CBC W/O DIFF    Collection Time: 22  3:48 AM   Result Value Ref Range    WBC 18.1 (H) 3.6 - 11.0 K/uL    RBC 3.63 (L) 3.80 - 5.20 M/uL    HGB 11.9 11.5 - 16.0 g/dL    HCT 35.0 35.0 - 47.0 %    MCV 96.4 80.0 - 99.0 FL    MCH 32.8 26.0 - 34.0 PG    MCHC 34.0 30.0 - 36.5 g/dL    RDW 14.5 11.5 - 14.5 %    PLATELET 401 145 - 048 K/uL    MPV 11.6 8.9 - 12.9 FL NRBC 0.0 0  WBC    ABSOLUTE NRBC 0.00 0.00 - 0.01 K/uL           Assessment:  POD # 1 s/p RLTCS, failed TOLAC    Plan:     1. VMI / Rh pos / Rubella immune / circ desired  2. Encourage ambulation. 3.  Continue routine postpartum/postop care.     Rui Rizvi DO, [address] [facility] [facility]

## 2022-04-02 NOTE — LACTATION NOTE
This note was copied from a baby's chart. Mom states baby is feeding well, on demand and offering breast to early cues of hunger. Denies need for assistance with feeding, and aware she can call [facility] for assistance. Mom  now 4year old child for 3 years. Discussed with mother her plan for feeding. Reviewed the benefits of exclusive breast milk feeding during the hospital stay. Informed her of the risks of using formula to supplement in the first few days of life as well as the benefits of successful breast milk feeding; referred her to the Breastfeeding booklet about this information. She acknowledges understanding of information reviewed and states that it is her plan to breastfeed her infant. Will support her choice and offer additional information as needed. Reviewed breastfeeding basics:  How milk is made and normal  breastfeeding behaviors discussed. Supply and demand,  stomach size, early feeding cues, skin to skin bonding with comfortable positioning and baby led latch-on reviewed. How to identify signs of successful breastfeeding sessions reviewed; education on asymetrical latch, signs of effective latching vs shallow, in-effective latching, normal  feeding frequency and duration and expected infant output discussed. Normal course of breastfeeding discussed including the AAP's recommendation that children receive exclusive breast milk feedings for the first six months of life with breast milk feedings to continue through the first year of life and/or beyond as complimentary table foods are added. Breastfeeding Booklet and Warm line information provided with discussion. Discussed typical  weight loss and the importance of pediatrician appointment within 24-48 hours of discharge, at 2 weeks of life and normalcy of requesting pediatric weight checks as needed in between visits.     Pt will successfully establish breastfeeding by feeding in response to early feeding cues   or wake every 3h, will obtain deep latch, and will keep log of feedings/output. Taught to BF at hunger cues and or q 2-3 hrs and to offer 10-20 drops of hand expressed colostrum at any non-feeds.       Breast Assessment  Left Breast: Medium,Large  Left Nipple: Everted,Intact  Right Breast: Medium,Large  Right Nipple: Everted,Intact  Breast- Feeding Assessment  Attends Breast-Feeding Classes: No  Breast-Feeding Experience:  (3 years with now 4year old)  Breast Trauma/Surgery: No  Type/Quality: Good (per mother)  Lactation Consultant Visits  Breast-Feedings:  (did not see at breast)  Mother/Infant Observation  Mother Observation: Breast comfortable,Recognizes feeding cues  Infant Observation: Feeding cues,Opens mouth  LATCH Documentation  Latch:  (did not see at breast, mother encouraged to call next feed)  Audible Swallowing: None  Type of Nipple: Everted (after stimulation)  Comfort (Breast/Nipple): Soft/non-tender  Hold (Positioning): Full assist, teach one side, mother does other, staff holds  [facility] Score: 7

## 2022-04-02 NOTE — OP NOTES
[facility] 80  OPERATIVE REPORT    Name:  Lele Hirsch  MR#:  [id number]  :  1985  ACCOUNT #:  [de-identified]  DATE OF SERVICE:  2022    PREOPERATIVE DIAGNOSES:  1. Intrauterine pregnancy at 40 weeks and 6 days. 2.  Trial of labor after . 3.  Nonreassuring fetal heart tracing. POSTOPERATIVE DIAGNOSES:  1. Intrauterine pregnancy at 40 weeks and 6 days. 2.  Trial of labor after . 3.  Nonreassuring fetal heart tracing. 4.  Failed trial of labor after . [facility]    SURGEON:  Carlene Bella DO    ASSISTANT:  Gissel Gastelum    ANESTHESIA:  Spinal.    COMPLICATIONS:  None. SPECIMENS REMOVED:  Placenta, disposed. IMPLANTS:  None. ESTIMATED BLOOD LOSS:  600 mL    DRAINS:  Salguero catheter. FINDINGS:  A viable male infant weighing 7 pounds 6 ounces, Apgars 9 at 1 and 9 at 5. Thin lower uterine segment. Asynclitic presentation was significant head molding. Clear fluid. Blood noted in the Salguero tubing after delivery and bladder was backfilled with sterile milk and no abnormalities noted after careful and thorough inspection. CONDITION:  Stable to recovery room. PROCEDURE:  The patient was taken to the operating room where spinal anesthesia was found to be adequate. She was placed in the dorsal supine position with a leftward tilt and prepped and draped in a normal sterile fashion. A Pfannenstiel skin incision was made with a scalpel and carried down to the underlying layer of fascia sharply. The fascia was then nicked in the midline and the incision was extended laterally with the Casiano scissors. The superior aspect of the fascial incision was then grasped with the Kochers, elevated, tented up, and the rectus muscles dissected off sharply. In a similar fashion, the inferior aspect of the fascial incision was grasped with the Kochers, elevated, tented up, and the rectus muscles dissected off sharply.   There was noted to be some scarring at the midline of the muscles to the underlying tissues. A scalpel was used to nick the area superiorly and two hemostats were used to gain entry into the peritoneal cavity. This incision was the extended superiorly and inferiorly with good visualization of the bladder. An Raza retractor was placed into the abdomen with careful attention brought to avoid underlying bowel or omentum entrapment. A bladder blade was then inserted and the vesicouterine peritoneum was grasped with the pickups and entered sharply with the Metzenbaum scissors. This incision was then extended superiorly and inferiorly with good visualization of the bladder. The bladder blade was then reinserted and the uterus was incised in a transverse fashion with a scalpel. Amniotomy was performed. The infant's head was then delivered atraumatically. Shoulders and body followed without difficulty. Delayed cord clamping was performed, after which the cord was then clamped and cut and the infant was handed to the waiting nursing staff crying vigorously. The placenta was then removed with a combination of gentle uterine massage and traction. The uterus was then exteriorized and cleared of all clots and debris. The uterine incision was then repaired using 0 Vicryl suture in a running locked fashion and a second layer was placed for hemostasis. At this time, there was noted to be bright red blood within the Salguero catheter tubing. Thus, the bladder was backfilled with sterile milk and carefully inspected for any injury to the bladder. There was no bladder injury noted. Thus, the Salguero was reconnected and the bladder was drained. Fiona was placed along the anterior uterine incision after the uterus had been replaced into the abdomen. Gutters were then cleared of all clots and debris. The peritoneum was reapproximated using 2-0 Vicryl suture in a running fashion.   The rectus muscles were then inspected and the Bovie was then used to cauterize any bleeding. The fascia was then reapproximated using 0 Vicryl suture in a running fashion. The subcutaneous adipose and the Sis fascia layer were reapproximated using 2-0 plain gut suture in a running fashion and the skin was closed using 3-0 Monocryl on a Josef needle in a subcuticular fashion. At this time, the procedure was complete. Sponge, lap and needle counts were correct x3 and the patient was taken to the recovery room in stable condition.       Zachery Shen [address] DO SRIDEVI      [id number]  D:  04/02/2022 11:05  T:  04/02/2022 16:06  JOB #:  [id number]

## 2022-04-03 PROCEDURE — 74011250637 HC RX REV CODE- 250/637: Performed by: OBSTETRICS & GYNECOLOGY

## 2022-04-03 PROCEDURE — 65270000029 HC RM PRIVATE

## 2022-04-03 RX ORDER — HYDROCODONE BITARTRATE AND ACETAMINOPHEN 5; 325 MG/1; MG/1
1-2 TABLET ORAL
Qty: 15 TABLET | Refills: 0 | Status: SHIPPED | OUTPATIENT
Start: 2022-04-03 | End: 2022-04-06

## 2022-04-03 RX ORDER — IBUPROFEN 800 MG/1
800 TABLET ORAL
Qty: 40 TABLET | Refills: 1 | Status: SHIPPED | OUTPATIENT
Start: 2022-04-03

## 2022-04-03 RX ADMIN — IBUPROFEN 800 MG: 800 TABLET, FILM COATED ORAL at 02:11

## 2022-04-03 RX ADMIN — IBUPROFEN 800 MG: 800 TABLET, FILM COATED ORAL at 09:55

## 2022-04-03 RX ADMIN — DOCUSATE SODIUM 100 MG: 100 CAPSULE, LIQUID FILLED ORAL at 09:55

## 2022-04-03 RX ADMIN — DOCUSATE SODIUM 100 MG: 100 CAPSULE, LIQUID FILLED ORAL at 17:38

## 2022-04-03 RX ADMIN — IBUPROFEN 800 MG: 800 TABLET, FILM COATED ORAL at 17:38

## 2022-04-03 NOTE — ROUTINE PROCESS
Bedside and Verbal shift change report given to Mecca Sheriff RN (oncoming nurse) by Merlinda Poot RN (offgoing nurse). Report included the following information SBAR, Kardex, Intake/Output, MAR and Accordion.

## 2022-04-03 NOTE — PROGRESS NOTES
Post-Operative  Day 2    Audelia Conner     Information for the patient's :  Denise Murry [[id number]]    Patient doing well without unusual complaints. Pain well controlled with PO pain medication. Tolerating regular diet. Flatus present. Ambulating. Vitals:  Visit Vitals  /74   Pulse 61   Temp 98.3 °F (36.8 °C)   Resp 17   Ht 5' 4.5\" (1.638 m)   Wt 84.4 kg (186 lb)   SpO2 99%   Breastfeeding Unknown   BMI 31.43 kg/m²     Temp (24hrs), Av.5 °F (36.9 °C), Min:98.3 °F (36.8 °C), Max:98.6 °F (37 °C)        Exam:      Patient without distress. Abdomen: soft, expected tenderness, fundus firm                Incision clean, dry and intact               Lower extremities are nontender with edema. Labs:   Lab Results   Component Value Date/Time    WBC 18.1 (H) 2022 03:48 AM    WBC 6.8 2022 05:59 PM    WBC 8.9 2017 04:29 AM    WBC 8.1 2017 01:28 AM    HGB 11.9 2022 03:48 AM    HGB 13.1 2022 05:59 PM    HGB 11.0 (L) 2017 04:29 AM    HGB 13.2 2017 01:28 AM    HCT 35.0 2022 03:48 AM    HCT 38.5 2022 05:59 PM    HCT 32.2 (L) 2017 04:29 AM    HCT 37.8 2017 01:28 AM    PLATELET 193 15/43/9773 03:48 AM    PLATELET 814  05:59 PM    PLATELET 290  04:29 AM    PLATELET 740  01:28 AM       No results found for this or any previous visit (from the past 24 hour(s)). Assessment: POD # 2 s/p  section      Plan:   1. VMI / Rh pos / Rubella immune / circ done  2. Continue routine post-operative care. 3.  Discharge home request today, reviewed instructions.   4.  Follow up 4-6 weeks    Patricia Zaldivar DO, [facility]

## 2022-04-03 NOTE — ROUTINE PROCESS
Bedside and Verbal shift change report given to Anabel Sawyer (oncoming nurse) by Galilea Martinez. Vivian Corley (offgoing nurse). Report given with SBAR, Kardex, Intake/Output and MAR.

## 2022-04-04 VITALS
HEIGHT: 65 IN | BODY MASS INDEX: 30.99 KG/M2 | DIASTOLIC BLOOD PRESSURE: 72 MMHG | SYSTOLIC BLOOD PRESSURE: 118 MMHG | OXYGEN SATURATION: 98 % | WEIGHT: 186 LBS | RESPIRATION RATE: 16 BRPM | TEMPERATURE: 98.3 F | HEART RATE: 56 BPM

## 2022-04-04 PROCEDURE — 74011250637 HC RX REV CODE- 250/637: Performed by: OBSTETRICS & GYNECOLOGY

## 2022-04-04 RX ADMIN — IBUPROFEN 800 MG: 800 TABLET, FILM COATED ORAL at 11:07

## 2022-04-04 RX ADMIN — DOCUSATE SODIUM 100 MG: 100 CAPSULE, LIQUID FILLED ORAL at 09:03

## 2022-04-04 RX ADMIN — IBUPROFEN 800 MG: 800 TABLET, FILM COATED ORAL at 02:15

## 2022-04-04 NOTE — H&P
NNP Update:     Umbilical cord assessed this am. Infant active and alert, good perfusion. Afebrile. Infant nursing well per report. Umbilical cord dressed with sterile gauze with no drainage on dressing. Proximal open end of cord does show small  amount  yellow/green sticky exudate but no edema , odor or erythema of cord noted. No bleeding. Area cleaned with alcohol x 3and redressed. Staff has continued to clean and dress site overnight. Infant carin above well. Bacitracin ointment to site BID ordered. Thank you for this consult.

## 2022-04-04 NOTE — DISCHARGE INSTRUCTIONS
Discharge Instructions for  Section    Patient ID:  General Kearns  [id number]  40 y.o.  1985    Take Home Medications       Continue taking your prenatal vitamins if you are breastfeeding. Follow-up care is a key part of your treatment and safety. Be sure to keep all your scheduled appointments    Activity  1. Avoid heavy lifting greater than 10lbs for 2 weeks after your surgery  2. Pelvic rest for 6 weeks, ie, nothing in your vagina for 6 weeks  (no intercourse, tampons, or douching). No tubs for 6 weeks. 3. No driving for 2 weeks and until you are no longer taking prescription pain medications and you can put your foot on the break in a hurry   4. Limit climbing stairs to only when necessary the first 1-2 weeks. Hold the railing and do not carry your baby up and downstairs at first for safety   5. You may walk as tolerated, though be care to not over-do it. Walking will assist in overall healing, decrease constipation and bloating. Lenice Ora feel better more quickly with some daily movement. Diet  Regular diet as tolerated    Wound care  There are several types of incision closures. 1. If you have metal staples in place when you leave the hospital, please call your doctors office to schedule the staple removal as directed at discharge. 2. If you have steri strips covering your incision, you may remove them as they fall off or be sure to remove them about one week after your surgery. Removing them in the shower may be easier. 3. If you have Dermabond, or skin glue, covering your incision, it will fall off slowly in the next few weeks. You may remove it as it begins to peel off. Additional wound care  1. Clear or reddish drainage from your incision is normal.  It's best to leave it open to the air, but if there is drainage, you may cover the incision lightly with gauze, preferably without tape. 2.  Keep the incision clean and dry.     3. Numbness of the skin at or around your incision is normal and the feeling usually returns gradually. 4. Call your doctor if you have increasing drainage from your incision, an unpleasant odor, red streaks, an increase in pain, or if it appears to open. Pain Management  1. Continue prescription pain medication as written by discharging physician  2. Over the counter medications such as Tylenol and ibuprofen (Motrin or Advil) are also ideal.  These may be taken together or in alternating doses. You may  take the maximum dose:  Motrin or Advil (generic ibuprofen), either 3 tablets every 6 hours or 4 tablets every 8 hours. Your prescription medication conntains a narcotic mixed with Tylenol,so  you should not take any extra Tylenol or acetaminophen until you have reduced your prescription pain medication. The maximum dose is Tylenol or acetominophen 1000 mg every 6 hours (equivalent to 2 Extra Strength Tylenols every 6 hours). 3. After a few days, begin to replace the prescription medication with over the counter medications. Use the prescription medication if needed for more severe pain or at night. The prescription medication can be addictive if overused. 4. Add heating pad or sitz baths as needed. Constipation  1. Constipation is normal after surgery, especially while taking prescription narcotic pain medication. 2. Over the counter remedies including ducosate (Colace), take 1-2 capsules 1-2 times daily for soft stool as needed. You may also add/ try milk of magnesia or rectal remedies such as Dulcolax or Fleets enema. Recovery: What to Expect at Home  1. Fatigue is expected. Try to rest when you can and don't worry about doing housework or other tasks which can wait. 2. The soreness in your incision will improve significantly over the first 2 weeks, but it may take 6-8 weeks before you are completely recovered. 3. Back pain or general body aches or muscle soreness are expected and should improve with acetominophen or ibuprofen.   4. Leg swelling due to pregnancy and/or IV fluids given in the hospital will take about two weeks to resolve. 5. Most women experience some form of the \"Baby Blues\" after having a baby. Feeling emotional, tearful, frustrated, anxious, sad, and irritable some of the time is normal and go away after about 2 weeks. Adequate rest and help from your family will help. Take breaks from caring for the baby. Call your doctor if your symptoms seem severe, last more than 2 weeks, or seem to be getting worse instead of better. Get help immediately if you have thoughts of wanting to hurt yourself or others! Call your doctor or seek immediate medical care if you have:  Heavy vaginal bleeding, soaking through one or more pads an hour for several hours. Foul-smelling discharge from your vagina or incision. Consistent nausea and vomiting and cannot keep fluids down. Consistent pain that does not get better after you take pain medicine. Sudden chest pain and shortness of breath  Signs of a blood clot: pain/ swelling/ increasing redness in your lower extremeties  Signs of infection: increased pain in your abdomen or vaginal area; red streaks, warmth, or tenderness of your breasts; fever of 100.5 F or greater    Patient Education         Section: What to Expect at [address] [place] [address]     A  section, or , is surgery to deliver your baby through a cut that the doctor makes in your lower belly and uterus. The cut is called an incision. You may have some pain in your lower belly and need pain medicine for 1 to 2 weeks. You can expect some vaginal bleeding for several weeks. You will probably need about 6 weeks to fully recover. It's important to take it easy while the incision heals. Avoid heavy lifting, strenuous activities, and exercises that strain the belly muscles while you recover. Ask a family member or friend for help with housework, cooking, and shopping.   This care sheet gives you a general idea about how long it will take for you to recover. But each person recovers at a different pace. Follow the steps below to get better as quickly as possible. How can you care for yourself at home? Activity    · Rest when you feel tired. Getting enough sleep will help you recover.     · Try to walk each day. Start by walking a little more than you did the day before. Bit by bit, increase the amount you walk. Walking boosts blood flow and helps prevent pneumonia, constipation, and blood clots.     · Avoid strenuous activities, such as bicycle riding, jogging, weightlifting, and aerobic exercise, for 6 weeks or until your doctor says it is okay.     · Until your doctor says it is okay, do not lift anything heavier than your baby.     · Do not do sit-ups or other exercises that strain the belly muscles for 6 weeks or until your doctor says it is okay.     · Hold a pillow over your incision when you cough or take deep breaths. This will support your belly and decrease your pain.     · You may shower as usual. Pat the incision dry when you are done.     · You will have some vaginal bleeding. Wear sanitary pads. Do not douche or use tampons until your doctor says it is okay.     · Ask your doctor when you can drive again.     · You will probably need to take at least 6 weeks off work. It depends on the type of work you do and how you feel.     · Ask your doctor when it is okay for you to have sex. Diet    · You can eat your normal diet. If your stomach is upset, try bland, low-fat foods like plain rice, broiled chicken, toast, and yogurt.     · Drink plenty of fluids (unless your doctor tells you not to).     · You may notice that your bowel movements are not regular right after your surgery. This is common. Try to avoid constipation and straining with bowel movements. You may want to take a fiber supplement every day.  If you have not had a bowel movement after a couple of days, ask your doctor about taking a mild laxative.     · If you are breastfeeding, limit alcohol. Alcohol can cause a lack of energy and other health problems for the baby when a breastfeeding woman drinks heavily. It can also get in the way of a mom's ability to feed her baby or to care for the child in other ways. There isn't a lot of research about exactly how much alcohol can harm a baby. Having no alcohol is the safest choice for your baby. If you choose to have a drink now and then, have only one drink, and limit the number of occasions that you have a drink. Wait to breastfeed at least 2 hours after you have a drink to reduce the amount of alcohol the baby may get in the milk. Medicines    · Your doctor will tell you if and when you can restart your medicines. You will also get instructions about taking any new medicines.     · If you take aspirin or some other blood thinner, ask your doctor if and when to start taking it again. Make sure that you understand exactly what your doctor wants you to do.     · Take pain medicines exactly as directed. ? If the doctor gave you a prescription medicine for pain, take it as prescribed. ? If you are not taking a prescription pain medicine, ask your doctor if you can take an over-the-counter medicine.     · If you think your pain medicine is making you sick to your stomach:  ? Take your medicine after meals (unless your doctor has told you not to). ? Ask your doctor for a different pain medicine.     · If your doctor prescribed antibiotics, take them as directed. Do not stop taking them just because you feel better. You need to take the full course of antibiotics. Incision care    · If you have strips of tape on the incision, leave the tape on for a week or until it falls off.     · Wash the area daily with warm, soapy water, and pat it dry. Don't use hydrogen peroxide or alcohol, which can slow healing. You may cover the area with a gauze bandage if it weeps or rubs against clothing.  Change the bandage every day.     · Keep the area clean and dry. Other instructions    · If you breastfeed your baby, you may be more comfortable while you are healing if you don't rest your baby on your belly. Try tucking your baby under your arm, with your baby's body along the side you will be feeding on. Support your baby's upper body with your arm. With that hand you can control your baby's head to bring your baby's mouth to your breast. This is sometimes called the football hold. Follow-up care is a key part of your treatment and safety. Be sure to make and go to all appointments, and call your doctor if you are having problems. It's also a good idea to know your test results and keep a list of the medicines you take. When should you call for help? Share this information with your partner, family, or a friend. They can help you watch for warning signs. Call 911  anytime you think you may need emergency care. For example, call if:    · You have thoughts of harming yourself, your baby, or another person.     · You passed out (lost consciousness).     · You have chest pain, are short of breath, or cough up blood.     · You have a seizure. Call your doctor now or seek immediate medical care if:    · You have loose stitches, or your incision comes open.     · You have signs of hemorrhage (too much bleeding), such as:  ? Heavy vaginal bleeding. This means that you are soaking through one or more pads in an hour. Or you pass blood clots bigger than an egg. ? Feeling dizzy or lightheaded, or you feel like you may faint. ? Feeling so tired or weak that you cannot do your usual activities. ? A fast or irregular heartbeat. ? New or worse belly pain.     · You have symptoms of infection, such as:  ? Increased pain, swelling, warmth, or redness. ? Red streaks leading from the incision. ? Pus draining from the incision. ? A fever. ?  Vaginal discharge that smells bad.  ? New or worse belly pain.     · You have symptoms of a blood clot in your leg (called a deep vein thrombosis), such as:  ? Pain in your calf, back of the knee, thigh, or groin. ? Redness and swelling in your leg or groin.     · You have signs of preeclampsia, such as:  ? Sudden swelling of your face, hands, or feet. ? New vision problems (such as dimness, blurring, or seeing spots). ? A severe headache. Watch closely for changes in your health, and be sure to contact your doctor if:    · Your vaginal bleeding isn't decreasing.     · You feel sad, anxious, or hopeless for more than a few days.     · You are having problems with your breasts or breastfeeding. Where can you learn more? Go to http://www.browne.com/  Enter M806 in the search box to learn more about \" Section: What to Expect at Home. \"  Current as of: 2021               Content Version: 13.2  © 9467-7921 [employer]. Care instructions adapted under license by [employer] (which disclaims liability or warranty for this information). If you have questions about a medical condition or this instruction, always ask your healthcare professional. [place] [address] any warranty or liability for your use of this information.

## 2022-04-04 NOTE — PROGRESS NOTES
Post-Operative  Day 3    Audelia Conner         Information for the patient's :  Cali Moon [[id number]]    Patient doing well without unusual complaints. Patient notes good relief with current pain medications. Patient reports normal lochia. She is currently tolerating general diet without nausea or vomiting. She reports flatus yes. Vitals:  Visit Vitals  /72 (BP 1 Location: Left arm, BP Patient Position: At rest)   Pulse (!) 56   Temp 98.3 °F (36.8 °C)   Resp 16   Ht 5' 4.5\" (1.638 m)   Wt 84.4 kg (186 lb)   SpO2 98%   Breastfeeding Unknown   BMI 31.43 kg/m²     Temp (24hrs), Av.3 °F (36.8 °C), Min:98.2 °F (36.8 °C), Max:98.3 °F (36.8 °C)      Last 24hr Input/Output:  No intake or output data in the 24 hours ending 22 0912       Exam:   Gen: Patient without distress. CV: RRR  Chest: CTA  Abdomen:soft, expected mild tenderness, fundus firm @U-2; incision closed with suture  Lower extremities are no tenderness with absent   edema. Labs:   Lab Results   Component Value Date/Time    WBC 18.1 (H) 2022 03:48 AM    WBC 6.8 2022 05:59 PM    WBC 8.9 2017 04:29 AM    WBC 8.1 2017 01:28 AM    HGB 11.9 2022 03:48 AM    HGB 13.1 2022 05:59 PM    HGB 11.0 (L) 2017 04:29 AM    HGB 13.2 2017 01:28 AM    HCT 35.0 2022 03:48 AM    HCT 38.5 2022 05:59 PM    HCT 32.2 (L) 2017 04:29 AM    HCT 37.8 2017 01:28 AM    PLATELET 389  03:48 AM    PLATELET 678  05:59 PM    PLATELET 778  04:29 AM    PLATELET 532  01:28 AM       No results found for this or any previous visit (from the past 24 hour(s)).   Lab Results   Component Value Date/Time    Rubella, External immune 2021 12:00 AM    GrBStrep, External negative 2022 12:00 AM    HBsAg, External negative 2021 12:00 AM    HIV, External negative 2021 12:00 AM    RPR, External nonreactive 2021 12:00 AM Gonorrhea, External Negative 2017 12:00 AM    Chlamydia, External Negative 2017 12:00 AM         Assessment:   POD 3 s/p RLTCS for failed TOLAC, NRFS. Doing well and stable  Plan:  1. Continue routine post operative care. 2.  Advance diet as tolerated, ambulate, incentive spirometry  3. Medical conditions: none  4. Discharge home today. Follow up as instructed  5.  If boy, circ: done    Mirella Carlson DO  2022  9:12 AM

## 2022-04-09 NOTE — ANESTHESIA POSTPROCEDURE EVALUATION
Procedure(s):   SECTION. epidural       Patient discharged by PACU nursing without anesthesiologist evaluation.         <[email or web address]>    INITIAL Post-op Vital signs:   Vitals Value Taken Time   /67 22   Temp 36.7 °C (98.1 °F) 22   Pulse 69 22   Resp     SpO2 100 % 22

## (undated) DEVICE — BULB SYRINGE, IRRIGATION WITH PROTECTIVE CAP, 60 CC, INDIVIDUALLY WRAPPED: Brand: DOVER

## (undated) DEVICE — 3000CC GUARDIAN II: Brand: GUARDIAN

## (undated) DEVICE — SUTURE MCRYL SZ 0 L36IN ABSRB UD L36MM CT-1 1/2 CIR Y946H

## (undated) DEVICE — SOLIDIFIER FLUID 3000 CC ABSORB

## (undated) DEVICE — STERILE POLYISOPRENE POWDER-FREE SURGICAL GLOVES: Brand: PROTEXIS

## (undated) DEVICE — C-SECTION II-LF: Brand: MEDLINE INDUSTRIES, INC.

## (undated) DEVICE — TOWEL,OR,DSP,ST,BLUE,STD,2/PK,40PK/CS: Brand: MEDLINE

## (undated) DEVICE — SUTURE PLN GUT SZ 2-0 L27IN ABSRB YELLOWISH TAN L40MM CT 853H

## (undated) DEVICE — REM POLYHESIVE ADULT PATIENT RETURN ELECTRODE: Brand: VALLEYLAB

## (undated) DEVICE — POOLE SUCTION INSTRUMENT WITH REMOVABLE SHEATH: Brand: POOLE

## (undated) DEVICE — SUTURE VCRL SZ 2-0 L27IN ABSRB UD L26MM SH 1/2 CIR J417H

## (undated) DEVICE — (D)PREP SKN CHLRAPRP APPL 26ML -- CONVERT TO ITEM 371833

## (undated) DEVICE — SPONGE: LAP 18X18 W  200/CS: Brand: MEDICAL ACTION INDUSTRIES

## (undated) DEVICE — TIP CLEANER: Brand: VALLEYLAB

## (undated) DEVICE — TRAY PREP DRY W/ PREM GLV 2 APPL 6 SPNG 2 UNDPD 1 OVERWRAP

## (undated) DEVICE — BLADE ASSEMB CLP HAIR FINE --

## (undated) DEVICE — POWDER HEMOSTAT GEL 3.0GR -- SURGICEL

## (undated) DEVICE — STRAP,POSITIONING,KNEE/BODY,FOAM,4X60": Brand: MEDLINE

## (undated) DEVICE — GOWN,AURORA,FABRIC-REINFORCED,X-LARGE: Brand: MEDLINE

## (undated) DEVICE — ROCKER SWITCH PENCIL HOLSTER: Brand: VALLEYLAB

## (undated) DEVICE — SUTURE 2-0 L36IN ABSRB BRN CT L40MM 1/2 CIR TAPERPOINT SGL 913H

## (undated) DEVICE — SUTURE VCRL SZ 2-0 L27IN ABSRB VLT L40MM CT 1/2 CIR J351H

## (undated) DEVICE — HANDLE LT SNAP ON ULT DURABLE LENS FOR TRUMPF ALC DISPOSABLE

## (undated) DEVICE — SUTURE MCRYL SZ 3-0 L27IN ABSRB UD L60MM KS STR REV CUT Y523H

## (undated) DEVICE — GLOVE SURG SZ 65 THK91MIL LTX FREE SYN POLYISOPRENE

## (undated) DEVICE — SOLUTION IV 1000ML 0.9% SOD CHL

## (undated) DEVICE — (D)STRIP SKN CLSR 0.5X4IN WHT --

## (undated) DEVICE — GARMENT,MEDLINE,DVT,INT,CALF,MED, GEN2: Brand: MEDLINE

## (undated) DEVICE — SUTURE MCRYL SZ 4-0 L27IN ABSRB UD L19MM PS-2 1/2 CIR PRIM Y426H

## (undated) DEVICE — LARGE, DISPOSABLE ALEXIS O C-SECTION PROTECTOR - RETRACTOR: Brand: ALEXIS ® O C-SECTION PROTECTOR - RETRACTOR

## (undated) DEVICE — TRAY CATH OD16FR SIL URIN M STATLOK STBL DEV SURSTP

## (undated) DEVICE — SUTURE VCRL SZ 0 L36IN ABSRB VLT L40MM CT 1/2 CIR J358H

## (undated) DEVICE — MASTISOL ADHESIVE LIQ 2/3ML

## (undated) DEVICE — SYRINGE IRRIG 60ML SFT PLIABLE BLB EZ TO GRP 1 HND USE W/

## (undated) DEVICE — TRAY,URINE METER,100% SILICONE,16FR10ML: Brand: MEDLINE

## (undated) DEVICE — KENDALL SCD EXPRESS SLEEVES, KNEE LENGTH, MEDIUM: Brand: KENDALL SCD